# Patient Record
Sex: MALE | Race: WHITE | NOT HISPANIC OR LATINO | Employment: OTHER | URBAN - METROPOLITAN AREA
[De-identification: names, ages, dates, MRNs, and addresses within clinical notes are randomized per-mention and may not be internally consistent; named-entity substitution may affect disease eponyms.]

---

## 2019-03-20 ENCOUNTER — HOSPITAL ENCOUNTER (INPATIENT)
Facility: HOSPITAL | Age: 79
LOS: 3 days | Discharge: HOME HEALTH | DRG: 312 | End: 2019-03-23
Attending: EMERGENCY MEDICINE | Admitting: INTERNAL MEDICINE
Payer: MEDICARE

## 2019-03-20 DIAGNOSIS — S00.81XA ABRASION OF FACE, INITIAL ENCOUNTER: ICD-10-CM
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
## 2019-03-20 DIAGNOSIS — S01.81XA LACERATION OF FOREHEAD, INITIAL ENCOUNTER: ICD-10-CM

## 2019-03-20 DIAGNOSIS — S00.83XA TRAUMATIC HEMATOMA OF FOREHEAD, INITIAL ENCOUNTER: ICD-10-CM

## 2019-03-20 DIAGNOSIS — R42 DIZZINESS: Primary | ICD-10-CM

## 2019-03-20 DIAGNOSIS — I10 BENIGN ESSENTIAL HTN: ICD-10-CM

## 2019-03-20 DIAGNOSIS — R79.89 ELEVATED TROPONIN: ICD-10-CM

## 2019-03-20 DIAGNOSIS — R55 NEAR SYNCOPE: ICD-10-CM

## 2019-03-20 PROBLEM — E87.6 HYPOKALEMIA: Status: ACTIVE | Noted: 2019-03-20

## 2019-03-20 PROBLEM — M06.9 RHEUMATOID ARTHRITIS: Status: ACTIVE | Noted: 2019-03-20

## 2019-03-20 PROBLEM — T14.8XXA HEMATOMA: Status: ACTIVE | Noted: 2019-03-20

## 2019-03-20 PROBLEM — I95.1 ORTHOSTATIC HYPOTENSION: Status: ACTIVE | Noted: 2019-03-20

## 2019-03-20 PROBLEM — E87.1 HYPONATREMIA: Status: ACTIVE | Noted: 2019-03-20

## 2019-03-20 LAB
ALBUMIN SERPL BCP-MCNC: 3.7 G/DL
ALP SERPL-CCNC: 45 U/L
ALT SERPL W/O P-5'-P-CCNC: 16 U/L
ANION GAP SERPL CALC-SCNC: 12 MMOL/L
AST SERPL-CCNC: 22 U/L
BASOPHILS # BLD AUTO: 0.01 K/UL
BASOPHILS NFR BLD: 0.1 %
BILIRUB SERPL-MCNC: 1.2 MG/DL
BUN SERPL-MCNC: 32 MG/DL
CALCIUM SERPL-MCNC: 9 MG/DL
CHLORIDE SERPL-SCNC: 90 MMOL/L
CO2 SERPL-SCNC: 26 MMOL/L
CREAT SERPL-MCNC: 1.2 MG/DL
D DIMER PPP IA.FEU-MCNC: 3.91 MG/L FEU
DIFFERENTIAL METHOD: ABNORMAL
EOSINOPHIL # BLD AUTO: 0 K/UL
EOSINOPHIL NFR BLD: 0.5 %
ERYTHROCYTE [DISTWIDTH] IN BLOOD BY AUTOMATED COUNT: 12.8 %
EST. GFR  (AFRICAN AMERICAN): >60 ML/MIN/1.73 M^2
EST. GFR  (NON AFRICAN AMERICAN): 57 ML/MIN/1.73 M^2
GLUCOSE SERPL-MCNC: 115 MG/DL
HCT VFR BLD AUTO: 38.2 %
HGB BLD-MCNC: 13.8 G/DL
LYMPHOCYTES # BLD AUTO: 0.5 K/UL
LYMPHOCYTES NFR BLD: 5.5 %
MAGNESIUM SERPL-MCNC: 2.2 MG/DL
MCH RBC QN AUTO: 32.5 PG
MCHC RBC AUTO-ENTMCNC: 36.1 G/DL
MCV RBC AUTO: 90 FL
MONOCYTES # BLD AUTO: 0.8 K/UL
MONOCYTES NFR BLD: 10.1 %
NEUTROPHILS # BLD AUTO: 6.8 K/UL
NEUTROPHILS NFR BLD: 83.8 %
PLATELET # BLD AUTO: 210 K/UL
PMV BLD AUTO: 9.9 FL
POTASSIUM SERPL-SCNC: 2.9 MMOL/L
PROT SERPL-MCNC: 7 G/DL
RBC # BLD AUTO: 4.24 M/UL
SODIUM SERPL-SCNC: 128 MMOL/L
TROPONIN I SERPL DL<=0.01 NG/ML-MCNC: 0.02 NG/ML
WBC # BLD AUTO: 8.14 K/UL

## 2019-03-20 PROCEDURE — 84484 ASSAY OF TROPONIN QUANT: CPT

## 2019-03-20 PROCEDURE — 93010 EKG 12-LEAD: ICD-10-PCS | Mod: ,,, | Performed by: INTERNAL MEDICINE

## 2019-03-20 PROCEDURE — 36415 COLL VENOUS BLD VENIPUNCTURE: CPT

## 2019-03-20 PROCEDURE — 93005 ELECTROCARDIOGRAM TRACING: CPT

## 2019-03-20 PROCEDURE — 25000003 PHARM REV CODE 250: Performed by: REGISTERED NURSE

## 2019-03-20 PROCEDURE — 93010 ELECTROCARDIOGRAM REPORT: CPT | Mod: ,,, | Performed by: INTERNAL MEDICINE

## 2019-03-20 PROCEDURE — G0378 HOSPITAL OBSERVATION PER HR: HCPCS

## 2019-03-20 PROCEDURE — 11000001 HC ACUTE MED/SURG PRIVATE ROOM

## 2019-03-20 PROCEDURE — 83735 ASSAY OF MAGNESIUM: CPT

## 2019-03-20 PROCEDURE — 25000003 PHARM REV CODE 250: Performed by: EMERGENCY MEDICINE

## 2019-03-20 PROCEDURE — 90471 IMMUNIZATION ADMIN: CPT | Performed by: REGISTERED NURSE

## 2019-03-20 PROCEDURE — 99285 EMERGENCY DEPT VISIT HI MDM: CPT | Mod: 25

## 2019-03-20 PROCEDURE — 80053 COMPREHEN METABOLIC PANEL: CPT

## 2019-03-20 PROCEDURE — 90715 TDAP VACCINE 7 YRS/> IM: CPT | Performed by: REGISTERED NURSE

## 2019-03-20 PROCEDURE — 96360 HYDRATION IV INFUSION INIT: CPT

## 2019-03-20 PROCEDURE — 85379 FIBRIN DEGRADATION QUANT: CPT

## 2019-03-20 PROCEDURE — 25000003 PHARM REV CODE 250: Performed by: NURSE PRACTITIONER

## 2019-03-20 PROCEDURE — 63600175 PHARM REV CODE 636 W HCPCS: Performed by: REGISTERED NURSE

## 2019-03-20 PROCEDURE — 25500020 PHARM REV CODE 255: Performed by: EMERGENCY MEDICINE

## 2019-03-20 PROCEDURE — 85025 COMPLETE CBC W/AUTO DIFF WBC: CPT

## 2019-03-20 PROCEDURE — 12011 RPR F/E/E/N/L/M 2.5 CM/<: CPT

## 2019-03-20 RX ORDER — ONDANSETRON 4 MG/1
4 TABLET, ORALLY DISINTEGRATING ORAL
Status: COMPLETED | OUTPATIENT
Start: 2019-03-20 | End: 2019-03-20

## 2019-03-20 RX ORDER — FLUTICASONE PROPIONATE 50 MCG
1 SPRAY, SUSPENSION (ML) NASAL
COMMUNITY
Start: 2014-06-02

## 2019-03-20 RX ORDER — HYDRALAZINE HYDROCHLORIDE 20 MG/ML
10 INJECTION INTRAMUSCULAR; INTRAVENOUS EVERY 8 HOURS PRN
Status: DISCONTINUED | OUTPATIENT
Start: 2019-03-20 | End: 2019-03-23 | Stop reason: HOSPADM

## 2019-03-20 RX ORDER — BUSPIRONE HYDROCHLORIDE 7.5 MG/1
7.5 TABLET ORAL 2 TIMES DAILY
COMMUNITY

## 2019-03-20 RX ORDER — IBUPROFEN 200 MG
16 TABLET ORAL
Status: DISCONTINUED | OUTPATIENT
Start: 2019-03-20 | End: 2019-03-23 | Stop reason: HOSPADM

## 2019-03-20 RX ORDER — SODIUM CHLORIDE 0.9 % (FLUSH) 0.9 %
5 SYRINGE (ML) INJECTION
Status: DISCONTINUED | OUTPATIENT
Start: 2019-03-20 | End: 2019-03-23 | Stop reason: HOSPADM

## 2019-03-20 RX ORDER — HYDROCHLOROTHIAZIDE 25 MG/1
25 TABLET ORAL DAILY
Status: ON HOLD | COMMUNITY
End: 2019-03-23 | Stop reason: HOSPADM

## 2019-03-20 RX ORDER — POTASSIUM CHLORIDE 20 MEQ/1
40 TABLET, EXTENDED RELEASE ORAL
Status: COMPLETED | OUTPATIENT
Start: 2019-03-20 | End: 2019-03-20

## 2019-03-20 RX ORDER — VERAPAMIL HYDROCHLORIDE 240 MG/1
240 TABLET, FILM COATED, EXTENDED RELEASE ORAL 2 TIMES DAILY
Status: ON HOLD | COMMUNITY
End: 2019-03-23 | Stop reason: HOSPADM

## 2019-03-20 RX ORDER — GLUCAGON 1 MG
1 KIT INJECTION
Status: DISCONTINUED | OUTPATIENT
Start: 2019-03-20 | End: 2019-03-23 | Stop reason: HOSPADM

## 2019-03-20 RX ORDER — METHOTREXATE 2.5 MG/1
2.5 TABLET ORAL
COMMUNITY

## 2019-03-20 RX ORDER — HYDROXYCHLOROQUINE SULFATE 200 MG/1
TABLET, FILM COATED ORAL DAILY
COMMUNITY

## 2019-03-20 RX ORDER — IPRATROPIUM BROMIDE 42 UG/1
2 SPRAY, METERED NASAL
COMMUNITY
Start: 2019-02-26

## 2019-03-20 RX ORDER — ASPIRIN 81 MG/1
81 TABLET ORAL DAILY
COMMUNITY

## 2019-03-20 RX ORDER — IBUPROFEN 200 MG
24 TABLET ORAL
Status: DISCONTINUED | OUTPATIENT
Start: 2019-03-20 | End: 2019-03-23 | Stop reason: HOSPADM

## 2019-03-20 RX ORDER — POTASSIUM CHLORIDE 20 MEQ/1
40 TABLET, EXTENDED RELEASE ORAL ONCE
Status: COMPLETED | OUTPATIENT
Start: 2019-03-20 | End: 2019-03-20

## 2019-03-20 RX ORDER — SODIUM CHLORIDE 9 MG/ML
INJECTION, SOLUTION INTRAVENOUS CONTINUOUS
Status: DISCONTINUED | OUTPATIENT
Start: 2019-03-20 | End: 2019-03-23 | Stop reason: HOSPADM

## 2019-03-20 RX ORDER — ATENOLOL 50 MG/1
50 TABLET ORAL DAILY
Status: ON HOLD | COMMUNITY
End: 2019-03-23 | Stop reason: HOSPADM

## 2019-03-20 RX ORDER — ONDANSETRON 8 MG/1
8 TABLET, ORALLY DISINTEGRATING ORAL EVERY 8 HOURS PRN
Status: DISCONTINUED | OUTPATIENT
Start: 2019-03-20 | End: 2019-03-23 | Stop reason: HOSPADM

## 2019-03-20 RX ORDER — ONDANSETRON 2 MG/ML
4 INJECTION INTRAMUSCULAR; INTRAVENOUS EVERY 8 HOURS PRN
Status: DISCONTINUED | OUTPATIENT
Start: 2019-03-20 | End: 2019-03-23 | Stop reason: HOSPADM

## 2019-03-20 RX ADMIN — BACITRACIN, NEOMYCIN, POLYMYXIN B 1 EACH: 400; 3.5; 5 OINTMENT TOPICAL at 05:03

## 2019-03-20 RX ADMIN — SODIUM CHLORIDE 1000 ML: 0.9 INJECTION, SOLUTION INTRAVENOUS at 02:03

## 2019-03-20 RX ADMIN — POTASSIUM CHLORIDE 40 MEQ: 1500 TABLET, EXTENDED RELEASE ORAL at 04:03

## 2019-03-20 RX ADMIN — IOHEXOL 100 ML: 350 INJECTION, SOLUTION INTRAVENOUS at 04:03

## 2019-03-20 RX ADMIN — Medication 1 ML: at 02:03

## 2019-03-20 RX ADMIN — SODIUM CHLORIDE: 0.9 INJECTION, SOLUTION INTRAVENOUS at 07:03

## 2019-03-20 RX ADMIN — ONDANSETRON 4 MG: 4 TABLET, ORALLY DISINTEGRATING ORAL at 04:03

## 2019-03-20 RX ADMIN — CLOSTRIDIUM TETANI TOXOID ANTIGEN (FORMALDEHYDE INACTIVATED), CORYNEBACTERIUM DIPHTHERIAE TOXOID ANTIGEN (FORMALDEHYDE INACTIVATED), BORDETELLA PERTUSSIS TOXOID ANTIGEN (GLUTARALDEHYDE INACTIVATED), BORDETELLA PERTUSSIS FILAMENTOUS HEMAGGLUTININ ANTIGEN (FORMALDEHYDE INACTIVATED), BORDETELLA PERTUSSIS PERTACTIN ANTIGEN, AND BORDETELLA PERTUSSIS FIMBRIAE 2/3 ANTIGEN 0.5 ML: 5; 2; 2.5; 5; 3; 5 INJECTION, SUSPENSION INTRAMUSCULAR at 04:03

## 2019-03-20 RX ADMIN — POTASSIUM CHLORIDE 40 MEQ: 1500 TABLET, EXTENDED RELEASE ORAL at 07:03

## 2019-03-20 NOTE — ED PROVIDER NOTES
SCRIBE #1 NOTE: I, Nafisa Cunningham, am scribing for, and in the presence of,  REBECCA Ferrara. I have scribed the following portions of the note - Other sections scribed: ED Discussion and Disposition.         HISTORY     Chief Complaint   Patient presents with    Fall     ground level fall in parking lot. abrasion and lac to right side of face. denies LOC     Review of patient's allergies indicates:  No Known Allergies     HPI   The history is provided by the patient.   Fall   The accident occurred just prior to arrival. The fall occurred while walking. Distance fallen: Ground level. He landed on concrete. The volume of blood lost was minimal. The point of impact was the face. The pain is present in the face. The pain is at a severity of 0/10. He was ambulatory at the scene. There was no entrapment after the fall. There was no drug use involved in the accident. There was no alcohol use involved in the accident. Pertinent negatives include no neck pain, no back pain, no visual change, no fever, no abdominal pain, no nausea, no vomiting, no headaches and no loss of consciousness.   [facility]. He states that he has problems with his glucose dropping, however pt has not taken his glucose to know if glucose is actually dropping. He reports intermittent dizzy spells and nausea over the last 2 months. Pt denies any NVD, CP, SOB, headache or any other symptoms at this time.      PCP: Leonides Muñoz MD     Past Medical History:  Past Medical History:   Diagnosis Date    Hypertension     Rheumatoid arthritis         Past Surgical History:  History reviewed. No pertinent history.      Family History:  History reviewed. No pertinent family history.     Social History:  Social History     Tobacco Use    Smoking status: Unknown   Substance and Sexual Activity    Alcohol use: Unknown    Drug use: Unknown    Sexual activity: Unknown         ROS   Review of  Systems   Constitutional: Negative for fever.   HENT: Negative for sore throat.    Respiratory: Negative for shortness of breath.    Cardiovascular: Negative for chest pain.   Gastrointestinal: Negative for abdominal pain, nausea and vomiting.   Genitourinary: Negative for dysuria.   Musculoskeletal: Negative for back pain and neck pain.   Skin: Negative for rash.        + Abrasions R face  + Laceration to forehead and nose    Neurological: Positive for dizziness. Negative for loss of consciousness, weakness and headaches.   Hematological: Does not bruise/bleed easily.   All other systems reviewed and are negative.      PHYSICAL EXAM     Initial Vitals [03/20/19 1302]   BP Pulse Resp Temp SpO2   (!) 142/78 68 18 98.8 °F (37.1 °C) (!) 93 %      MAP       --           Physical Exam    Constitutional: He appears well-developed and well-nourished. No distress.   HENT:   Head: Normocephalic. Head is with abrasion, with contusion and with laceration. Head is without raccoon's eyes, without Hargrove's sign and without right periorbital erythema.       Right Ear: No hemotympanum.   Left Ear: No hemotympanum.   Nose: Nose lacerations present.       Superficial laceration to bridge of nose, 1 cm partial thickness laceration with surrounding abrasions to forehead   Eyes: Conjunctivae and EOM are normal. Pupils are equal, round, and reactive to light.   Neck: Normal range of motion. Neck supple.   Cardiovascular: Normal rate and regular rhythm.   Pulmonary/Chest: Breath sounds normal. No respiratory distress. He has no wheezes. He has no rales.   Abdominal: Soft. Bowel sounds are normal.   Musculoskeletal: Normal range of motion.        Right lower leg: He exhibits no swelling and no edema.        Left lower leg: He exhibits no swelling and no edema.   Neurological: He is alert and oriented to person, place, and time. He has normal strength. No cranial nerve deficit or sensory deficit. GCS eye subscore is 4. GCS verbal subscore  "is 5. GCS motor subscore is 6.   Skin: Skin is warm and dry. Capillary refill takes less than 2 seconds. No rash noted.   Psychiatric: He has a normal mood and affect.          ED COURSE   Lac Repair  Date/Time: 3/20/2019 3:27 PM  Performed by: Arcenio Salomon Jr., FNP  Authorized by: Abby Dunaway MD   Consent Done: Yes  Consent: Verbal consent obtained.  Consent given by: patient  Body area: head/neck  Location details: forehead  Laceration length: 1 cm  Foreign bodies: no foreign bodies    Anesthesia:  Local Anesthetic: LET (lido,epi,tetracaine)  Patient sedated: no  Preparation: Patient was prepped and draped in the usual sterile fashion.  Irrigation solution: saline  Irrigation method: syringe  Amount of cleaning: standard  Skin closure: 5-0 Prolene  Number of sutures: 2  Technique: simple  Approximation: close  Approximation difficulty: simple  Dressing: 4x4 sterile gauze  Patient tolerance: Patient tolerated the procedure well with no immediate complications        ED ONGOING VITALS:  Vitals:    03/20/19 1302 03/20/19 1405 03/20/19 1407 03/20/19 1409   BP: (!) 142/78 (!) 173/75 119/78 (!) 91/48   Pulse: 68 (!) 59 67 68   Resp: 18      Temp: 98.8 °F (37.1 °C)      TempSrc: Oral      SpO2: (!) 93%      Height: 5' 9" (1.753 m)       03/20/19 1510 03/20/19 1720 03/20/19 1721 03/20/19 1736   BP: (!) 151/76 (!) 174/87 (!) 174/83 122/65   Pulse: 68 73 77 83   Resp: 18      Temp: 98.6 °F (37 °C)      TempSrc: Oral      SpO2: 97%      Height:             ABNORMAL LAB VALUES:  Labs Reviewed   CBC W/ AUTO DIFFERENTIAL - Abnormal; Notable for the following components:       Result Value    RBC 4.24 (*)     Hemoglobin 13.8 (*)     Hematocrit 38.2 (*)     MCH 32.5 (*)     MCHC 36.1 (*)     Lymph # 0.5 (*)     Gran% 83.8 (*)     Lymph% 5.5 (*)     All other components within normal limits   COMPREHENSIVE METABOLIC PANEL - Abnormal; Notable for the following components:    Sodium 128 (*)     Potassium 2.9 (*)     " Chloride 90 (*)     Glucose 115 (*)     BUN, Bld 32 (*)     Total Bilirubin 1.2 (*)     Alkaline Phosphatase 45 (*)     eGFR if non  57 (*)     All other components within normal limits   D DIMER, QUANTITATIVE - Abnormal; Notable for the following components:    D-Dimer 3.91 (*)     All other components within normal limits   TROPONIN I   URINALYSIS, REFLEX TO URINE CULTURE         ALL LAB VALUES:  Results for orders placed or performed during the hospital encounter of 03/20/19   CBC auto differential   Result Value Ref Range    WBC 8.14 3.90 - 12.70 K/uL    RBC 4.24 (L) 4.60 - 6.20 M/uL    Hemoglobin 13.8 (L) 14.0 - 18.0 g/dL    Hematocrit 38.2 (L) 40.0 - 54.0 %    MCV 90 82 - 98 fL    MCH 32.5 (H) 27.0 - 31.0 pg    MCHC 36.1 (H) 32.0 - 36.0 g/dL    RDW 12.8 11.5 - 14.5 %    Platelets 210 150 - 350 K/uL    MPV 9.9 9.2 - 12.9 fL    Gran # (ANC) 6.8 1.8 - 7.7 K/uL    Lymph # 0.5 (L) 1.0 - 4.8 K/uL    Mono # 0.8 0.3 - 1.0 K/uL    Eos # 0.0 0.0 - 0.5 K/uL    Baso # 0.01 0.00 - 0.20 K/uL    Gran% 83.8 (H) 38.0 - 73.0 %    Lymph% 5.5 (L) 18.0 - 48.0 %    Mono% 10.1 4.0 - 15.0 %    Eosinophil% 0.5 0.0 - 8.0 %    Basophil% 0.1 0.0 - 1.9 %    Differential Method Automated    Comprehensive metabolic panel   Result Value Ref Range    Sodium 128 (L) 136 - 145 mmol/L    Potassium 2.9 (L) 3.5 - 5.1 mmol/L    Chloride 90 (L) 95 - 110 mmol/L    CO2 26 23 - 29 mmol/L    Glucose 115 (H) 70 - 110 mg/dL    BUN, Bld 32 (H) 8 - 23 mg/dL    Creatinine 1.2 0.5 - 1.4 mg/dL    Calcium 9.0 8.7 - 10.5 mg/dL    Total Protein 7.0 6.0 - 8.4 g/dL    Albumin 3.7 3.5 - 5.2 g/dL    Total Bilirubin 1.2 (H) 0.1 - 1.0 mg/dL    Alkaline Phosphatase 45 (L) 55 - 135 U/L    AST 22 10 - 40 U/L    ALT 16 10 - 44 U/L    Anion Gap 12 8 - 16 mmol/L    eGFR if African American >60 >60 mL/min/1.73 m^2    eGFR if non African American 57 (A) >60 mL/min/1.73 m^2   Troponin I   Result Value Ref Range    Troponin I 0.021 0.000 - 0.026 ng/mL   D dimer,  quantitative   Result Value Ref Range    D-Dimer 3.91 (H) <0.50 mg/L FEU           RADIOLOGY STUDIES:  Imaging Results          CTA Chest Non-Coronary (PE Study) (Final result)  Result time 03/20/19 16:59:54    Final result by Jaspreet Bernard MD (03/20/19 16:59:54)                 Impression:      No acute abnormality identified.  No evidence of pulmonary embolism.      Electronically signed by: Jaspreet Bernard  Date:    03/20/2019  Time:    16:59             Narrative:    EXAMINATION:  CTA CHEST NON CORONARY    CLINICAL HISTORY:  dizziness with elevated d-dimer;    TECHNIQUE:  Low dose axial images, sagittal and coronal reformations were obtained from the thoracic inlet to the lung bases following the IV administration of 100 mL of Omnipaque 350.  All CT scans at this location are performed using dose modulation techniques as appropriate to a performed exam including the following: Automated exposure control; adjustment of the mA and/or kV  according to patient size.    COMPARISON:  Chest radiograph 03/20/2019    FINDINGS:  Base of Neck: No significant abnormality.    Thoracic soft tissues: Unremarkable.    Aorta: Left-sided aortic arch.  No aneurysm.  Advanced atherosclerotic calcification aorta and coronary arteries.    Heart: Moderate cardiomegaly.  No pericardial effusion.    Pulmonary vasculature: No evidence of pulmonary embolism.    Nidhi/Mediastinum: No pathologic jonathan enlargement.  Calcified mediastinal and hilar lymph nodes.    Airways: Mild bronchial wall thickening.    Lungs/Pleura: Subsegmental atelectasis posterior lung bases.  No pleural effusion or thickening.    Esophagus: Unremarkable.    Upper Abdomen: Moderate hiatal hernia.  Large bilateral renal cysts, 7 cm on the right, partially visualized 4.1 cm on the left.    Bones: No acute fracture. No suspicious lytic or sclerotic lesions.                               CT Head Without Contrast (Final result)  Result time 03/20/19 14:00:06    Final result  by ABDULLAHI Trejo Sr., MD (03/20/19 14:00:06)                 Impression:      1. There is a small hematoma overlying the right side of the frontal bone.  2. There is no calvarial fracture or intracranial hemorrhage.  3. There is mild generalized cerebral and cerebellar atrophy. There are chronic appearing ischemic changes in the deep white matter of both cerebral hemispheres. There is no evidence of an acute ischemic event.  All CT scans at this facility use dose modulation, iterative reconstruction, and/or weight base dosing when appropriate to reduce radiation dose when appropriate to reduce radiation dose to as low as reasonably achievable.      Electronically signed by: Omar Trejo MD  Date:    03/20/2019  Time:    14:00             Narrative:    EXAMINATION:  CT HEAD WITHOUT CONTRAST    CLINICAL HISTORY:  Dizziness;    TECHNIQUE:  Standard brain CT protocol without IV contrast was performed.    COMPARISON:  None    FINDINGS:  There is a small hematoma overlying the right side of the frontal bone.  There is no calvarial fracture or intracranial hemorrhage.  There is mild generalized cerebral and cerebellar atrophy.  There are chronic appearing ischemic changes in the deep white matter of both cerebral hemispheres.  There is no evidence of an acute ischemic event.  The paranasal sinuses are normal in appearance.                               X-Ray Chest PA And Lateral (Final result)  Result time 03/20/19 13:40:37    Final result by ABDULLAHI Trejo Sr., MD (03/20/19 13:40:37)                 Impression:      1. There is no evidence of an acute pulmonary process.  2. There is a 13 mm calcific density projected over the anterolateral aspect of the left 5th rib.  3. There is a mild amount of dextroconvex curvature of the thoracic spine. There are mild degenerative changes in the thoracic spine.  .      Electronically signed by: Omar Trejo MD  Date:    03/20/2019  Time:    13:40             Narrative:     EXAMINATION:  XR CHEST PA AND LATERAL    CLINICAL HISTORY:  Dizziness and giddiness    COMPARISON:  None    FINDINGS:  The size and contour of the heart are normal.  There is no evidence of an acute pulmonary process.  There is a 13 mm calcific density projected over the anterolateral aspect of the left 5th rib.  There is no pneumothorax or pleural effusion.  There are mild degenerative changes in the thoracic spine.  There is a mild amount of dextroconvex curvature of the thoracic spine.                                  EKG Readings: (Independently Interpreted)   Initial Reading: No STEMI. Heart Rate: 60. Clinical Impression: AV Block - 1st Degree   Sinus rhythm with sinus arrhythmia with 1st degree A-V block  Left anterior fascicular block  ST and T wave abnormality, consider anterior ischemia      Patients medications:   Outpatient Medications Marked as Taking for the 12/19/18 encounter (Office Visit) with Leonides Muñoz MD   Medication Sig Dispense Refill    atenolol (TENORMIN) 50 mg tablet Take 1 tablet by mouth once daily. 30 tablet 1    fluticasone (FLONASE) 50 mcg/actuation nasal spray 1 spray by Nasal route daily. (Patient taking differently: 1 spray by Nasal route as needed.) 16 g 5    hydroCHLOROthiazide (HYDRODIURIL) 25 mg tablet Take 1 tablet by mouth daily. 30 tablet 5    hydroxychloroquine (PLAQUENIL) 200 mg tablet TAKE TWO TABLETS BY MOUTH DAILY (Patient taking differently: one tablet daily) 60 tablet 11    ipratropium (ATROVENT) 42 mcg (0.06 %) nasal spray use 2 sprays in each nostril three times a day 15 mL 0    irbesartan (AVAPRO) 300 MG tablet Take 1 tablet by mouth daily. 30 tablet 5    methotrexate 2.5 mg tablet take 4 tablets by mouth every wednesday 16 tablet 3    MULTIVITAMIN (MULTIPLE VITAMINS ORAL) Take by mouth.    verapamil ER (CALAN-SR) 240 mg CR tablet TAKE ONE TABLET BY MOUTH TWICE DAILY 60 tablet 0         ED Medications:  There are no discharge medications for this  patient.    Discharge Medications:  New Prescriptions    No medications on file         ED Discussion  6:03 PM: Discussed case with ASHLEY Carl ([facility]). Dr. Montaño agrees with current care and management of pt and accepts admission.   Admitting Service: [facility]  Admitting Physician: Dr. Montaño  Admit to: Obs/Tele    6:15 PM: Re-evaluated pt. I have discussed test results, shared treatment plan, and the need for admission with patient and family at bedside. Pt and family express understanding at this time and agree with all information. All questions answered. Pt and family have no further questions or concerns at this time. Pt is ready for admit.      MEDICAL DECISION MAKING   Medical Decision Making:   Clinical Tests:   Lab Tests: Reviewed and Ordered  Radiological Study: Ordered and Reviewed  Medical Tests: Reviewed and Ordered            Scribe Attestation:   Scribe #1: I performed the above scribed service and the documentation accurately describes the services I performed. I attest to the accuracy of the note.    Attending Attestation:           Physician Attestation for Scribe:  Physician Attestation Statement for Scribe #1: I, REBECCA Ferrara, reviewed documentation, as scribed by Nafisa Cunningahm in my presence, and it is both accurate and complete.             CLINICAL IMPRESSION       ICD-10-CM ICD-9-CM   1. Dizziness R42 780.4   2. Fall, initial encounter W19.XXXA E888.9   3. Abrasion of face, initial encounter S00.81XA 910.0   4. Laceration of forehead, initial encounter S01.81XA 873.42   5. Traumatic hematoma of forehead, initial encounter S00.83XA 920       Disposition:   Disposition: Placed in Observation  Condition: Stable         Arcenio Salomon Jr., REBECCA  03/20/19 3431

## 2019-03-20 NOTE — ASSESSMENT & PLAN NOTE
- Secondary to fall PTA  - CT head showed a small hematoma overlying the right side of the frontal bone.  No calvarial fracture or intracranial hemorrhage.    - Continue local wound care  - No anticoagulation  - Monitor

## 2019-03-20 NOTE — SUBJECTIVE & OBJECTIVE
Past Medical History:   Diagnosis Date    Hypertension     Rheumatoid arthritis        No past surgical history on file.    Review of patient's allergies indicates:  No Known Allergies    No current facility-administered medications on file prior to encounter.      Current Outpatient Medications on File Prior to Encounter   Medication Sig    busPIRone (BUSPAR) 7.5 MG tablet Take 7.5 mg by mouth 2 (two) times daily.    methotrexate 2.5 MG Tab Take 2.5 mg by mouth every 7 days. Take 4 tablets every Wednesday    verapamil (CALAN-SR) 240 MG CR tablet Take 240 mg by mouth 2 (two) times daily.     Family History     None        Tobacco Use    Smoking status: Not on file   Substance and Sexual Activity    Alcohol use: Not on file    Drug use: Not on file    Sexual activity: Not on file     Review of Systems   Constitutional: Negative for chills, diaphoresis, fatigue and fever.   HENT: Negative for hearing loss, mouth sores, sore throat, tinnitus and trouble swallowing.    Eyes: Negative for pain, discharge and redness.   Respiratory: Negative for apnea, cough, choking, chest tightness, shortness of breath, wheezing and stridor.    Cardiovascular: Negative for chest pain, palpitations and leg swelling.   Gastrointestinal: Negative for abdominal distention, abdominal pain, blood in stool, constipation, diarrhea, nausea, rectal pain and vomiting.   Endocrine: Negative for cold intolerance, heat intolerance, polydipsia, polyphagia and polyuria.   Genitourinary: Negative for difficulty urinating, dysuria, flank pain, frequency, hematuria and urgency.   Musculoskeletal: Negative for arthralgias, back pain, gait problem, joint swelling, neck pain and neck stiffness.   Skin: Negative for color change, rash and wound.   Allergic/Immunologic: Negative for food allergies.   Neurological: Positive for dizziness and light-headedness. Negative for tremors, seizures, syncope, speech difficulty and headaches.   Hematological:  Negative for adenopathy. Does not bruise/bleed easily.   Psychiatric/Behavioral: Negative for agitation and confusion. The patient is not nervous/anxious.    All other systems reviewed and are negative.    Objective:     Vital Signs (Most Recent):  Temp: 98.6 °F (37 °C) (03/20/19 1510)  Pulse: 83 (03/20/19 1736)  Resp: 18 (03/20/19 1510)  BP: 122/65 (03/20/19 1736)  SpO2: 97 % (03/20/19 1510) Vital Signs (24h Range):  Temp:  [98.6 °F (37 °C)-98.8 °F (37.1 °C)] 98.6 °F (37 °C)  Pulse:  [59-83] 83  Resp:  [18] 18  SpO2:  [93 %-97 %] 97 %  BP: ()/(48-87) 122/65     Weight: (primary to weigh)  There is no height or weight on file to calculate BMI.    Physical Exam   Constitutional: He is oriented to person, place, and time. He appears well-developed and well-nourished. No distress.   HENT:   Head: Normocephalic.       Mouth/Throat: Oropharynx is clear and moist.   Eyes: Conjunctivae and EOM are normal. Pupils are equal, round, and reactive to light.       PERRLA, size 3 bilaterally with brisk response   Neck: Normal range of motion. Neck supple. No JVD present. Carotid bruit is not present.   Cardiovascular: Normal rate, regular rhythm, normal heart sounds and intact distal pulses. Exam reveals no gallop and no friction rub.   No murmur heard.  Pulmonary/Chest: Effort normal and breath sounds normal. No stridor. No respiratory distress. He has no wheezes. He has no rales. He exhibits no tenderness.   Abdominal: Soft. Bowel sounds are normal. He exhibits no distension and no mass. There is no tenderness. There is no rebound and no guarding.   Musculoskeletal: Normal range of motion. He exhibits no edema or tenderness.   Neurological: He is alert and oriented to person, place, and time. He has normal strength. No cranial nerve deficit or sensory deficit. GCS eye subscore is 4. GCS verbal subscore is 5. GCS motor subscore is 6.   Skin: Skin is warm and dry. No rash noted. He is not diaphoretic. No erythema.    Multiple abrasions/lacerations to right side of face, forehead, nose, and right knee.   Psychiatric: He has a normal mood and affect. His behavior is normal. Judgment and thought content normal.   Nursing note and vitals reviewed.        CRANIAL NERVES     CN III, IV, VI   Pupils are equal, round, and reactive to light.  Extraocular motions are normal.        Significant Labs:   CBC:   Recent Labs   Lab 03/20/19  1407   WBC 8.14   HGB 13.8*   HCT 38.2*        CMP:   Recent Labs   Lab 03/20/19  1407   *   K 2.9*   CL 90*   CO2 26   *   BUN 32*   CREATININE 1.2   CALCIUM 9.0   PROT 7.0   ALBUMIN 3.7   BILITOT 1.2*   ALKPHOS 45*   AST 22   ALT 16   ANIONGAP 12   EGFRNONAA 57*     Cardiac Markers: No results for input(s): CKMB, MYOGLOBIN, BNP, TROPISTAT in the last 48 hours.  Troponin:   Recent Labs   Lab 03/20/19  1407   TROPONINI 0.021       Significant Imaging: I have reviewed all pertinent imaging results/findings within the past 24 hours.   Imaging Results          CTA Chest Non-Coronary (PE Study) (Final result)  Result time 03/20/19 16:59:54    Final result by Jaspreet Bernard MD (03/20/19 16:59:54)                 Impression:      No acute abnormality identified.  No evidence of pulmonary embolism.      Electronically signed by: Jaspreet Bernard  Date:    03/20/2019  Time:    16:59             Narrative:    EXAMINATION:  CTA CHEST NON CORONARY    CLINICAL HISTORY:  dizziness with elevated d-dimer;    TECHNIQUE:  Low dose axial images, sagittal and coronal reformations were obtained from the thoracic inlet to the lung bases following the IV administration of 100 mL of Omnipaque 350.  All CT scans at this location are performed using dose modulation techniques as appropriate to a performed exam including the following: Automated exposure control; adjustment of the mA and/or kV  according to patient size.    COMPARISON:  Chest radiograph 03/20/2019    FINDINGS:  Base of Neck: No significant  abnormality.    Thoracic soft tissues: Unremarkable.    Aorta: Left-sided aortic arch.  No aneurysm.  Advanced atherosclerotic calcification aorta and coronary arteries.    Heart: Moderate cardiomegaly.  No pericardial effusion.    Pulmonary vasculature: No evidence of pulmonary embolism.    Nidhi/Mediastinum: No pathologic jonathan enlargement.  Calcified mediastinal and hilar lymph nodes.    Airways: Mild bronchial wall thickening.    Lungs/Pleura: Subsegmental atelectasis posterior lung bases.  No pleural effusion or thickening.    Esophagus: Unremarkable.    Upper Abdomen: Moderate hiatal hernia.  Large bilateral renal cysts, 7 cm on the right, partially visualized 4.1 cm on the left.    Bones: No acute fracture. No suspicious lytic or sclerotic lesions.                               CT Head Without Contrast (Final result)  Result time 03/20/19 14:00:06    Final result by ABDULLAHI Trejo Sr., MD (03/20/19 14:00:06)                 Impression:      1. There is a small hematoma overlying the right side of the frontal bone.  2. There is no calvarial fracture or intracranial hemorrhage.  3. There is mild generalized cerebral and cerebellar atrophy. There are chronic appearing ischemic changes in the deep white matter of both cerebral hemispheres. There is no evidence of an acute ischemic event.  All CT scans at this facility use dose modulation, iterative reconstruction, and/or weight base dosing when appropriate to reduce radiation dose when appropriate to reduce radiation dose to as low as reasonably achievable.      Electronically signed by: Omar Trejo MD  Date:    03/20/2019  Time:    14:00             Narrative:    EXAMINATION:  CT HEAD WITHOUT CONTRAST    CLINICAL HISTORY:  Dizziness;    TECHNIQUE:  Standard brain CT protocol without IV contrast was performed.    COMPARISON:  None    FINDINGS:  There is a small hematoma overlying the right side of the frontal bone.  There is no calvarial fracture or  intracranial hemorrhage.  There is mild generalized cerebral and cerebellar atrophy.  There are chronic appearing ischemic changes in the deep white matter of both cerebral hemispheres.  There is no evidence of an acute ischemic event.  The paranasal sinuses are normal in appearance.                               X-Ray Chest PA And Lateral (Final result)  Result time 03/20/19 13:40:37    Final result by ABDULLAHI Trejo Sr., MD (03/20/19 13:40:37)                 Impression:      1. There is no evidence of an acute pulmonary process.  2. There is a 13 mm calcific density projected over the anterolateral aspect of the left 5th rib.  3. There is a mild amount of dextroconvex curvature of the thoracic spine. There are mild degenerative changes in the thoracic spine.  .      Electronically signed by: Omar Trejo MD  Date:    03/20/2019  Time:    13:40             Narrative:    EXAMINATION:  XR CHEST PA AND LATERAL    CLINICAL HISTORY:  Dizziness and giddiness    COMPARISON:  None    FINDINGS:  The size and contour of the heart are normal.  There is no evidence of an acute pulmonary process.  There is a 13 mm calcific density projected over the anterolateral aspect of the left 5th rib.  There is no pneumothorax or pleural effusion.  There are mild degenerative changes in the thoracic spine.  There is a mild amount of dextroconvex curvature of the thoracic spine.

## 2019-03-20 NOTE — ASSESSMENT & PLAN NOTE
- Placed in OBS  - CT head showed a small hematoma overlying the right side of the frontal bone.  No calvarial fracture or intracranial hemorrhage.  There is mild generalized cerebral and cerebellar atrophy.  There are chronic appearing ischemic changes in the deep white matter of both cerebral hemispheres.  There is no evidence of an acute ischemic event.   - Positive orthostatic BP in the ER  - Hold home Verapamil for now  - Continuous IVFs  - Place MAX hose  - Carotid US and ECHO pending  - Check orthostatics  - Neuro checks q 4 hours  - Tele monitoring

## 2019-03-20 NOTE — ED NOTES
Patient complains of falling after feeling dizzy. Associated symptoms include lac to face.     Level of Consciousness: The patient is awake, alert, and oriented with appropriate affect and speech; oriented to person, place and time.  Appearance: Sitting up in bed with no acute distress noted. Clothing and hygiene are clean and worn appropriately.  Skin: Skin is warm and dry with good skin turgor; intact; color consistent with ethnicity.  Mucous membranes are moist. Lac and abrasion to right side of forehead,  Musculoskeletal: Moves all extremities well in full range of motion. No obvious deformities or swelling noted.  Respiratory: Airway open and patent, respirations spontaneous, even and unlabored. No accessory muscles in use. Breath sounds clear.  Cardiac: Regular rate and rhythm, no peripheral edema noted, good pulses palpated peripherally, capillary refill < 3 seconds.  Abdomen: Soft, non-tender to palpation. No distention noted.  Neurologic: PERRLA, face exhibits symmetrical expression, hand grasps equal and even bilaterally, reports normal sensation to all extremities and face.  Psychosocial: calm and cooperative.     Patient verbalized understanding of status and plan of care. Side rails up x 2, call light in reach, bed low and locked.  Will continue to monitor.

## 2019-03-20 NOTE — HPI
"Khang Patterson is a 79 y/o male with a PMHx of HTN and RA, who presented to the ER today for evaluation secondary to Near Syncope and a Fall.  Patient was reportedly walking in the [employer] parking lot today when he became dizzy and fell, striking his head and right knee on the concrete.  Patient reports episodes of dizziness "on and off for about 3 weeks now where I have to hold onto things to keep from falling".  He is noted to have a lacerations to his nose and forehead and a hematoma above his right eye.  He denies CP, SOB, H/A, visual disturbances, N/V/D, abdominal pain, dysuria, and all other symptoms at this time.  Patient does report periods of hypoglycemia, but is uncertain if his blood glucose was low at the time of his fall.  No aggrevating or alleviating factors reported.  No prior treatment.  ER workup showed:  Stable VS, but positive orthostatics.  12 lead EKG showed Sinus rhythm with sinus arrhythmia with 1st degree A-V block.  CBC unremarkable.  D dimer elevated to 3.91.  CTA chest negative for PE.  BMP revealed Na of 128, KCL 2.9, Cl 90, .  Initial troponin negative.  CT head showed a small hematoma overlying the right side of the frontal bone.  No calvarial fracture or intracranial hemorrhage.  There is mild generalized cerebral and cerebellar atrophy.  There are chronic appearing ischemic changes in the deep white matter of both cerebral hemispheres.  There is no evidence of an acute ischemic event.  ECHO and Carotid US are pending.  Hospital Medicine called for admission.  He will be placed in OBS.  He is a Full Code.  His SDM is his son Adis who can be reached at [phone].  "

## 2019-03-21 PROBLEM — R94.31 ABNORMAL ECG: Status: ACTIVE | Noted: 2019-03-21

## 2019-03-21 PROBLEM — R42 DIZZINESS: Status: RESOLVED | Noted: 2019-03-21 | Resolved: 2019-03-21

## 2019-03-21 PROBLEM — E87.6 HYPOKALEMIA: Status: RESOLVED | Noted: 2019-03-20 | Resolved: 2019-03-21
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
## 2019-03-21 PROBLEM — R42 DIZZINESS: Status: ACTIVE | Noted: 2019-03-21

## 2019-03-21 LAB
ANION GAP SERPL CALC-SCNC: 6 MMOL/L
ANION GAP SERPL CALC-SCNC: 8 MMOL/L
AORTIC VALVE REGURGITATION: NORMAL
BASOPHILS # BLD AUTO: 0.01 K/UL
BASOPHILS NFR BLD: 0.1 %
BUN SERPL-MCNC: 16 MG/DL
BUN SERPL-MCNC: 19 MG/DL
CALCIUM SERPL-MCNC: 8.1 MG/DL
CALCIUM SERPL-MCNC: 8.2 MG/DL
CHLORIDE SERPL-SCNC: 97 MMOL/L
CHLORIDE SERPL-SCNC: 97 MMOL/L
CO2 SERPL-SCNC: 23 MMOL/L
CO2 SERPL-SCNC: 27 MMOL/L
CREAT SERPL-MCNC: 0.8 MG/DL
CREAT SERPL-MCNC: 0.9 MG/DL
DIFFERENTIAL METHOD: ABNORMAL
EOSINOPHIL # BLD AUTO: 0 K/UL
EOSINOPHIL NFR BLD: 0.4 %
ERYTHROCYTE [DISTWIDTH] IN BLOOD BY AUTOMATED COUNT: 12.8 %
EST. GFR  (AFRICAN AMERICAN): >60 ML/MIN/1.73 M^2
EST. GFR  (AFRICAN AMERICAN): >60 ML/MIN/1.73 M^2
EST. GFR  (NON AFRICAN AMERICAN): >60 ML/MIN/1.73 M^2
EST. GFR  (NON AFRICAN AMERICAN): >60 ML/MIN/1.73 M^2
ESTIMATED AVG GLUCOSE: 103 MG/DL
GLUCOSE SERPL-MCNC: 107 MG/DL
GLUCOSE SERPL-MCNC: 110 MG/DL
HBA1C MFR BLD HPLC: 5.2 %
HCT VFR BLD AUTO: 33.8 %
HGB BLD-MCNC: 12 G/DL
LYMPHOCYTES # BLD AUTO: 0.6 K/UL
LYMPHOCYTES NFR BLD: 6.2 %
MAGNESIUM SERPL-MCNC: 2 MG/DL
MCH RBC QN AUTO: 32.2 PG
MCHC RBC AUTO-ENTMCNC: 35.5 G/DL
MCV RBC AUTO: 91 FL
MITRAL VALVE REGURGITATION: NORMAL
MONOCYTES # BLD AUTO: 1.1 K/UL
MONOCYTES NFR BLD: 11.5 %
NEUTROPHILS # BLD AUTO: 7.6 K/UL
NEUTROPHILS NFR BLD: 81.8 %
PLATELET # BLD AUTO: 197 K/UL
PMV BLD AUTO: 10 FL
POTASSIUM SERPL-SCNC: 3.3 MMOL/L
POTASSIUM SERPL-SCNC: 3.8 MMOL/L
RBC # BLD AUTO: 3.73 M/UL
RETIRED EF AND QEF - SEE NOTES: 60 (ref 55–65)
SODIUM SERPL-SCNC: 128 MMOL/L
SODIUM SERPL-SCNC: 130 MMOL/L
TRICUSPID VALVE REGURGITATION: NORMAL
TROPONIN I SERPL DL<=0.01 NG/ML-MCNC: 0.39 NG/ML
WBC # BLD AUTO: 9.32 K/UL

## 2019-03-21 PROCEDURE — 93306 TTE W/DOPPLER COMPLETE: CPT

## 2019-03-21 PROCEDURE — 93306 2D ECHO WITH COLOR FLOW DOPPLER: ICD-10-PCS | Mod: 26,,, | Performed by: INTERNAL MEDICINE

## 2019-03-21 PROCEDURE — 80048 BASIC METABOLIC PNL TOTAL CA: CPT

## 2019-03-21 PROCEDURE — 97162 PT EVAL MOD COMPLEX 30 MIN: CPT | Performed by: PHYSICAL THERAPIST

## 2019-03-21 PROCEDURE — 85025 COMPLETE CBC W/AUTO DIFF WBC: CPT

## 2019-03-21 PROCEDURE — 99223 1ST HOSP IP/OBS HIGH 75: CPT | Mod: ,,, | Performed by: INTERNAL MEDICINE

## 2019-03-21 PROCEDURE — 84484 ASSAY OF TROPONIN QUANT: CPT

## 2019-03-21 PROCEDURE — 36415 COLL VENOUS BLD VENIPUNCTURE: CPT

## 2019-03-21 PROCEDURE — 99223 PR INITIAL HOSPITAL CARE,LEVL III: ICD-10-PCS | Mod: ,,, | Performed by: INTERNAL MEDICINE

## 2019-03-21 PROCEDURE — 83036 HEMOGLOBIN GLYCOSYLATED A1C: CPT

## 2019-03-21 PROCEDURE — 83735 ASSAY OF MAGNESIUM: CPT

## 2019-03-21 PROCEDURE — 25000003 PHARM REV CODE 250

## 2019-03-21 PROCEDURE — 21400001 HC TELEMETRY ROOM

## 2019-03-21 PROCEDURE — 93306 TTE W/DOPPLER COMPLETE: CPT | Mod: 26,,, | Performed by: INTERNAL MEDICINE

## 2019-03-21 PROCEDURE — 97530 THERAPEUTIC ACTIVITIES: CPT | Performed by: PHYSICAL THERAPIST

## 2019-03-21 RX ORDER — POTASSIUM CHLORIDE 20 MEQ/1
TABLET, EXTENDED RELEASE ORAL
Status: COMPLETED
Start: 2019-03-21 | End: 2019-03-21

## 2019-03-21 RX ORDER — POTASSIUM CHLORIDE 20 MEQ/1
40 TABLET, EXTENDED RELEASE ORAL ONCE
Status: COMPLETED | OUTPATIENT
Start: 2019-03-21 | End: 2019-03-21

## 2019-03-21 RX ADMIN — POTASSIUM CHLORIDE 40 MEQ: 20 TABLET, EXTENDED RELEASE ORAL at 08:03

## 2019-03-21 RX ADMIN — POTASSIUM CHLORIDE 40 MEQ: 1500 TABLET, EXTENDED RELEASE ORAL at 08:03

## 2019-03-21 NOTE — HPI
Pt had near syncope at [employer] yesterday.  He felt dizzy walking inside store, fell down.  He then got up and near his car, he again felt dizzy and fell down on face.  No clear syncope.   Denies chest pain sxs or unusual dyspnea.  Has had chronic intermittent dizziness, imbalance.  Denies h/o cad, chf, mi/cva.  ecg on admit NSR, LAFB 1 av block, anterior T wave inversions. No old ecg to compare.  Echo today shows normal LV function.  Troponin only checked once on admit and was negative.  His potassium and sodium were significantly low on admit and now improved with repletion, IV fluids.  His HTN meds have been held.    CTA chest for abnl d-dimer showed no PE.  Carotid u/s no significant disease.  He has been on high dose Verapamil 240 mg bid and Atenolol and HCTZ.

## 2019-03-21 NOTE — SUBJECTIVE & OBJECTIVE
Interval History:  No acute events overnight.  Resting comfortably in bed with wife at BS.  Carotid US negative.  ECHO pending.  Na improved to 130, KCL improved to 3.8.  Orthostatic hypotension persists, but dizziness is improving.  Continue IVFs.  Cardiology consult pending.  Will transition to inpatient for continued care.    Review of Systems   Constitutional: Negative for chills, diaphoresis, fatigue and fever.   HENT: Negative for hearing loss, mouth sores, sore throat, tinnitus and trouble swallowing.    Eyes: Negative for pain, discharge and redness.   Respiratory: Negative for apnea, cough, choking, chest tightness, shortness of breath, wheezing and stridor.    Cardiovascular: Negative for chest pain, palpitations and leg swelling.   Gastrointestinal: Negative for abdominal distention, abdominal pain, blood in stool, constipation, diarrhea, nausea, rectal pain and vomiting.   Endocrine: Negative for cold intolerance, heat intolerance, polydipsia, polyphagia and polyuria.   Genitourinary: Negative for difficulty urinating, dysuria, flank pain, frequency, hematuria and urgency.   Musculoskeletal: Negative for arthralgias, back pain, gait problem, joint swelling, neck pain and neck stiffness.   Skin: Negative for color change, rash and wound.   Allergic/Immunologic: Negative for food allergies.   Neurological: Positive for dizziness and light-headedness. Negative for tremors, seizures, syncope, speech difficulty and headaches.   Hematological: Negative for adenopathy. Does not bruise/bleed easily.   Psychiatric/Behavioral: Negative for agitation and confusion. The patient is not nervous/anxious.    All other systems reviewed and are negative.    Objective:     Vital Signs (Most Recent):  Temp: 97.7 °F (36.5 °C) (03/21/19 0922)  Pulse: 61 (03/21/19 1250)  Resp: 18 (03/21/19 0922)  BP: (!) 123/58 (03/21/19 1250)  SpO2: (!) 92 % (03/21/19 0922) Vital Signs (24h Range):  Temp:  [97.7 °F (36.5 °C)-98.6 °F (37 °C)]  97.7 °F (36.5 °C)  Pulse:  [54-83] 61  Resp:  [16-24] 18  SpO2:  [92 %-97 %] 92 %  BP: ()/(48-90) 123/58     Weight: 68.5 kg (151 lb 0.2 oz)  Body mass index is 22.3 kg/m².    Intake/Output Summary (Last 24 hours) at 3/21/2019 1309  Last data filed at 3/20/2019 1550  Gross per 24 hour   Intake 1000 ml   Output --   Net 1000 ml      Physical Exam   Constitutional: He is oriented to person, place, and time. He appears well-developed and well-nourished. No distress.   HENT:   Head: Normocephalic.       Mouth/Throat: Oropharynx is clear and moist.   Eyes: Conjunctivae and EOM are normal. Pupils are equal, round, and reactive to light.       PERRLA, size 3 bilaterally with brisk response   Neck: Normal range of motion. Neck supple. No JVD present. Carotid bruit is not present.   Cardiovascular: Normal rate, regular rhythm, normal heart sounds and intact distal pulses. Exam reveals no gallop and no friction rub.   No murmur heard.  Pulmonary/Chest: Effort normal and breath sounds normal. No stridor. No respiratory distress. He has no wheezes. He has no rales. He exhibits no tenderness.   Abdominal: Soft. Bowel sounds are normal. He exhibits no distension and no mass. There is no tenderness. There is no rebound and no guarding.   Musculoskeletal: Normal range of motion. He exhibits no edema or tenderness.   Neurological: He is alert and oriented to person, place, and time. He has normal strength. No cranial nerve deficit or sensory deficit. GCS eye subscore is 4. GCS verbal subscore is 5. GCS motor subscore is 6.   Skin: Skin is warm and dry. No rash noted. He is not diaphoretic. No erythema.   Multiple abrasions/lacerations to right side of face, forehead, nose, and right knee.   Psychiatric: He has a normal mood and affect. His behavior is normal. Judgment and thought content normal.   Nursing note and vitals reviewed.      Significant Labs:   A1C:   Recent Labs   Lab 03/21/19  0500   HGBA1C 5.2     BMP:   Recent  Labs   Lab 03/21/19  0500 03/21/19  1147    107   * 130*   K 3.3* 3.8   CL 97 97   CO2 23 27   BUN 19 16   CREATININE 0.8 0.9   CALCIUM 8.1* 8.2*   MG 2.0  --      CBC:   Recent Labs   Lab 03/20/19  1407 03/21/19  0500   WBC 8.14 9.32   HGB 13.8* 12.0*   HCT 38.2* 33.8*    197     Magnesium:   Recent Labs   Lab 03/20/19  1407 03/21/19  0500   MG 2.2 2.0       Significant Imaging: I have reviewed all pertinent imaging results/findings within the past 24 hours.

## 2019-03-21 NOTE — PROGRESS NOTES
"[facility] Hospital Medicine  Progress Note    Patient Name: Khang Patterson  MRN: [id number]  Patient Class: IP- Inpatient   Admission Date: 3/20/2019  Length of Stay: 0 days  Attending Physician: Osmani Montaño MD  Primary Care Provider: Leonides Muñoz MD        Subjective:     Principal Problem:Near syncope    HPI:  Khang Patterson is a 79 y/o male with a PMHx of HTN and RA, who presented to the ER today for evaluation secondary to Near Syncope and a Fall.  Patient was reportedly walking in the [employer] today when he became dizzy and fell, striking his head and right knee on the concrete.  Patient reports episodes of dizziness "on and off for about 3 weeks now where I have to hold onto things to keep from falling".  He is noted to have a lacerations to his nose and forehead and a hematoma above his right eye.  He denies CP, SOB, H/A, visual disturbances, N/V/D, abdominal pain, dysuria, and all other symptoms at this time.  Patient does report periods of hypoglycemia, but is uncertain if his blood glucose was low at the time of his fall.  No aggrevating or alleviating factors reported.  No prior treatment.  ER workup showed:  Stable VS, but positive orthostatics.  12 lead EKG showed Sinus rhythm with sinus arrhythmia with 1st degree A-V block.  CBC unremarkable.  D dimer elevated to 3.91.  CTA chest negative for PE.  BMP revealed Na of 128, KCL 2.9, Cl 90, .  Initial troponin negative.  CT head showed a small hematoma overlying the right side of the frontal bone.  No calvarial fracture or intracranial hemorrhage.  There is mild generalized cerebral and cerebellar atrophy.  There are chronic appearing ischemic changes in the deep white matter of both cerebral hemispheres.  There is no evidence of an acute ischemic event.  ECHO and Carotid US are pending.  Hospital Medicine called for admission.  He will be placed in OBS.  He is a Full Code.  His SDM is his son Adis who can " "be reached at [phone].    Hospital Course:  On 3/20 patient was placed in OBS secondary to Near Syncope, Fall, and Orthostatic Hypotension.  Patient reports a 3 week h/o dizziness "so bad I have to stop and hold onto things".  Patient reports he has never "passed out, or fallen before".  CT head showed a small hematoma overlying the right side of the frontal bone.  There is no calvarial fracture or intracranial hemorrhage.  There is mild generalized cerebral and cerebellar atrophy and chronic appearing ischemic changes in the deep white matter of both cerebral hemispheres.  There is no evidence of an acute ischemic event.  D dimer noted to be 3.91, but patient has a h/o RA.  CTA chest negative for PE.  12 lead EKG showed SR with 1st degree AVB.  Troponin negative.  KCl 2.9 for which he was given a total of 80 meq.  Na 128.  Patient's home Verapamil was held and he was started on IVFs.    As of 3/21 Carotid US negative.  ECHO pending.  Na improved to 130, KCL improved to 3.8.  Orthostatic hypotension persists, but dizziness is improving.  Continue IVFs.  Cardiology consult pending.  Will transition to inpatient for continued care.    Interval History:  No acute events overnight.  Resting comfortably in bed with wife at BS.  Carotid US negative.  ECHO pending.  Na improved to 130, KCL improved to 3.8.  Orthostatic hypotension persists, but dizziness is improving.  Continue IVFs.  Cardiology consult pending.  Will transition to inpatient for continued care.    Review of Systems   Constitutional: Negative for chills, diaphoresis, fatigue and fever.   HENT: Negative for hearing loss, mouth sores, sore throat, tinnitus and trouble swallowing.    Eyes: Negative for pain, discharge and redness.   Respiratory: Negative for apnea, cough, choking, chest tightness, shortness of breath, wheezing and stridor.    Cardiovascular: Negative for chest pain, palpitations and leg swelling.   Gastrointestinal: Negative for abdominal " distention, abdominal pain, blood in stool, constipation, diarrhea, nausea, rectal pain and vomiting.   Endocrine: Negative for cold intolerance, heat intolerance, polydipsia, polyphagia and polyuria.   Genitourinary: Negative for difficulty urinating, dysuria, flank pain, frequency, hematuria and urgency.   Musculoskeletal: Negative for arthralgias, back pain, gait problem, joint swelling, neck pain and neck stiffness.   Skin: Negative for color change, rash and wound.   Allergic/Immunologic: Negative for food allergies.   Neurological: Positive for dizziness and light-headedness. Negative for tremors, seizures, syncope, speech difficulty and headaches.   Hematological: Negative for adenopathy. Does not bruise/bleed easily.   Psychiatric/Behavioral: Negative for agitation and confusion. The patient is not nervous/anxious.    All other systems reviewed and are negative.    Objective:     Vital Signs (Most Recent):  Temp: 97.7 °F (36.5 °C) (03/21/19 0922)  Pulse: 61 (03/21/19 1250)  Resp: 18 (03/21/19 0922)  BP: (!) 123/58 (03/21/19 1250)  SpO2: (!) 92 % (03/21/19 0922) Vital Signs (24h Range):  Temp:  [97.7 °F (36.5 °C)-98.6 °F (37 °C)] 97.7 °F (36.5 °C)  Pulse:  [54-83] 61  Resp:  [16-24] 18  SpO2:  [92 %-97 %] 92 %  BP: ()/(48-90) 123/58     Weight: 68.5 kg (151 lb 0.2 oz)  Body mass index is 22.3 kg/m².    Intake/Output Summary (Last 24 hours) at 3/21/2019 1309  Last data filed at 3/20/2019 1550  Gross per 24 hour   Intake 1000 ml   Output --   Net 1000 ml      Physical Exam   Constitutional: He is oriented to person, place, and time. He appears well-developed and well-nourished. No distress.   HENT:   Head: Normocephalic.       Mouth/Throat: Oropharynx is clear and moist.   Eyes: Conjunctivae and EOM are normal. Pupils are equal, round, and reactive to light.       PERRLA, size 3 bilaterally with brisk response   Neck: Normal range of motion. Neck supple. No JVD present. Carotid bruit is not present.    Cardiovascular: Normal rate, regular rhythm, normal heart sounds and intact distal pulses. Exam reveals no gallop and no friction rub.   No murmur heard.  Pulmonary/Chest: Effort normal and breath sounds normal. No stridor. No respiratory distress. He has no wheezes. He has no rales. He exhibits no tenderness.   Abdominal: Soft. Bowel sounds are normal. He exhibits no distension and no mass. There is no tenderness. There is no rebound and no guarding.   Musculoskeletal: Normal range of motion. He exhibits no edema or tenderness.   Neurological: He is alert and oriented to person, place, and time. He has normal strength. No cranial nerve deficit or sensory deficit. GCS eye subscore is 4. GCS verbal subscore is 5. GCS motor subscore is 6.   Skin: Skin is warm and dry. No rash noted. He is not diaphoretic. No erythema.   Multiple abrasions/lacerations to right side of face, forehead, nose, and right knee.   Psychiatric: He has a normal mood and affect. His behavior is normal. Judgment and thought content normal.   Nursing note and vitals reviewed.      Significant Labs:   A1C:   Recent Labs   Lab 03/21/19  0500   HGBA1C 5.2     BMP:   Recent Labs   Lab 03/21/19  0500 03/21/19  1147    107   * 130*   K 3.3* 3.8   CL 97 97   CO2 23 27   BUN 19 16   CREATININE 0.8 0.9   CALCIUM 8.1* 8.2*   MG 2.0  --      CBC:   Recent Labs   Lab 03/20/19  1407 03/21/19  0500   WBC 8.14 9.32   HGB 13.8* 12.0*   HCT 38.2* 33.8*    197     Magnesium:   Recent Labs   Lab 03/20/19  1407 03/21/19  0500   MG 2.2 2.0       Significant Imaging: I have reviewed all pertinent imaging results/findings within the past 24 hours.    Assessment/Plan:      * Near syncope    - Transition to inpatient for continued care  - CT head showed a small hematoma overlying the right side of the frontal bone.  No calvarial fracture or intracranial hemorrhage.  There is mild generalized cerebral and cerebellar atrophy.  There are chronic  appearing ischemic changes in the deep white matter of both cerebral hemispheres.  There is no evidence of an acute ischemic event.   - Positive Orthostatics  - Hold home Verapamil for now  - Continuous IVFs  - Place MAX hose  - Carotid US negative  - ECHO pending  - Cardiology consult pending given degree of orthostasis with supine HTN  - Check orthostatics  - Neuro checks q 4 hours  - Tele monitoring       Orthostatic hypotension    - See plan as per above       Hematoma    - Secondary to fall PTA  - CT head showed a small hematoma overlying the right side of the frontal bone.  No calvarial fracture or intracranial hemorrhage.    - Continue local wound care  - No anticoagulation  - Monitor     Abrasion of face    - Continue local wound care  - Received tetanus vaccine in the ER       Hyponatremia    - Na improved from 128 to 130  - Continue NS at 125 ml/hour  - Daily BMP  - Monitor       Benign essential HTN    - Supine BP elevated, but patient with + orthostatics  - Hold home BP meds for now  - Hydralazine PRN SBP >180  - Monitor        Rheumatoid arthritis    - Hold home Methotrexate for now and resume at DC       Fall    - Secondary to #1  - PT/OT consults pending         VTE Risk Mitigation (From admission, onward)        Ordered     Place MAX hose  Until discontinued      03/20/19 1853     IP VTE HIGH RISK PATIENT  Once      03/20/19 1828     Place sequential compression device  Until discontinued      03/20/19 1828     Place MAX hose  Until discontinued     Hold AC given hematoma 03/20/19 1808              Jazmine Roach, MARJORIE, ACNP-BC  Department of Hospital Medicine   [facility]

## 2019-03-21 NOTE — ED NOTES
Pt resting comfortably in bed, awake, alert and oriented. Bed low, side rails up, and call light within reach. Spouse at BS.

## 2019-03-21 NOTE — HOSPITAL COURSE
"On 3/20 patient was placed in OBS secondary to Near Syncope, Fall, and Orthostatic Hypotension.  Patient reports a 3 week h/o dizziness "so bad I have to stop and hold onto things".  Patient reports he has never "passed out, or fallen before".  CT head showed a small hematoma overlying the right side of the frontal bone.  There is no calvarial fracture or intracranial hemorrhage.  There is mild generalized cerebral and cerebellar atrophy and chronic appearing ischemic changes in the deep white matter of both cerebral hemispheres.  There is no evidence of an acute ischemic event.  D dimer noted to be 3.91, but patient has a h/o RA.  CTA chest negative for PE.  12 lead EKG showed SR with 1st degree AVB.  Troponin negative.  KCl 2.9 for which he was given a total of 80 meq.  Na 128.  Patient's home Verapamil was held and he was started on IVFs.    As of 3/21 Carotid US negative.  ECHO pending.  Na improved to 130, KCL improved to 3.8.  Orthostatic hypotension persists, but dizziness is improving.  Continue IVFs.  Cardiology consult pending.  Will transition to inpatient for continued care.    As of 3/22 ECHO showed normal EF.  Second Troponin elevated to 0.38, third decreased to 0.193.  Patient is asymptomatic, denying CP and/or equivalent.  Currently denies dizziness.  Cardiology following and recommended resuming home Atenolol at 1/2 home dose.  Also recommended to stop HCTZ (which may be contributing to his hyponatremia), and started on Candasartan today.  Home Verapamil remains on hold.  Per cards, patient will need to f/u with them in clinic in 1 week to arrange Zio holter, and will need an EP consult.  He would also benefit from a stress test once facial wounds have healed.  Na 127.  Plan to continue IVFs overnight and anticipate DC home in AM if Na improves.  PT/OT has evaluated and recommended HH upon DC, which has been ordered with CM to arrange.  Patient seen seen and examined today prior to his discharge to " home.

## 2019-03-21 NOTE — SIGNIFICANT EVENT
Cardiology input appreciated.  Initial troponin on presentation negative.  Repeat today elevated to 0.38.  Patient is asymptomatic and denies CP and/or equivalent.  Case d/w Dr. Santillan who recommends repeating troponin in the morning.  No AC given small hematoma overlying the right side of the frontal bone.  Continue to monitor.

## 2019-03-21 NOTE — ASSESSMENT & PLAN NOTE
- KCL imrp  - Given 40 meq po in the ER, will give additional 40 meq at 2000  - Mag pending  - Daily BMP  - Monitor and replete as needed

## 2019-03-21 NOTE — PT/OT/SLP EVAL
Physical Therapy Evaluation    Patient Name:  Khang Patterson   MRN:  [id number]    Recommendations:     Discharge Recommendations:  home health PT, outpatient OT   Discharge Equipment Recommendations: (TBD, may need a RW)   Barriers to discharge: None    Assessment:     Khang Patterson is a 78 y.o. male admitted with a medical diagnosis of Near syncope.  He presents with the following impairments/functional limitations:  weakness, impaired endurance, gait instability, impaired functional mobilty, impaired self care skills, impaired balance, decreased lower extremity function, decreased coordination, decreased safety awareness.    Rehab Prognosis: Good; patient would benefit from acute skilled PT services to address these deficits and reach maximum level of function.    Recent Surgery: * No surgery found *      Plan:     During this hospitalization, patient to be seen 5 x/week(Pt to be seen a minimum of 5 out 7 days a week for skilled PT) to address the identified rehab impairments via gait training, therapeutic activities, therapeutic exercises and progress toward the following goals:    · Plan of Care Expires:  03/28/19    Subjective     Chief Complaint: falls  Patient/Family Comments/goals: to return to prior level  Pain/Comfort:  · Pain Rating 1: 0/10  · Pain Rating Post-Intervention 1: 0/10    Patients cultural, spiritual, Congregational conflicts given the current situation: no    Living Environment:  Pt lives alone in 1 story house with no steps. Pt reports that his friend is at his house or he is at her house every weekend, and that she will be helping him after discharge from facility.   Prior to admission, patients level of function was (I) with ADLs and Ambulated without AD. Pt drives and is retired.  Equipment used at home: none.  DME owned (not currently used): none.  Upon discharge, patient will have assistance from his friend.    Objective:     Communicated with Nurse Goff and epic chart review prior to  session.  Patient found supine with telemetry, peripheral IV  upon PT entry to room.    General Precautions: Standard, fall   Orthopedic Precautions:N/A   Braces: N/A     Exams:  · Cognitive Exam:  Patient is oriented to Person, Place, Time and Situation  · Postural Exam:  Patient presented with the following abnormalities:    · -       Rounded shoulders  · -       Forward head  · RLE ROM: WFL  · RLE Strength: 4/5 grossly  · LLE ROM: WFL  · LLE Strength: 4/5 grossly    Functional Mobility:  · Bed Mobility:     · Rolling Left:  stand by assistance  · Rolling Right: stand by assistance  · Scooting: stand by assistance  · Supine to Sit: minimum assistance  · Transfers:     · Sit to Stand:  contact guard assistance with no AD  · Bed to Chair: minimum assistance with  no AD  using  Step Transfer  · Toilet Transfer: minimum assistance with  no AD  using  Step Transfer  · Gait: ~25ft with Min A and no AD; pt had swaying and kept trying to hold onto items in the hallway. PT offered pt a RW and pt requested not to use it.   · Balance: Poor+ dynamic sitting balance; Poor+ dynamic standing balance   · Pt donned gown as a robe with Min A while sitting EOB. Pt lost balance while sitting EOB and required Min A to recover. Pt had urine accident during ambulation, therefore t/ed to toilet with Min A and performed hygiene tasks with Mod A. Pt t/f to bedside chair with Min A and donned gown with Min A in sitting, and socks with Mod A. Pt's IV began to actively bleed, therefore Nurse Goff was called to room to assess.         PT Eval completed as listed above. Pt and friend educated in role of PT and POC.     AM-PAC 6 CLICK MOBILITY  Total Score:17     Patient left up in chair with all lines intact, call button in reach, Nurse Goff notified and friend present.    GOALS:   Multidisciplinary Problems     Physical Therapy Goals        Problem: Physical Therapy Goal    Goal Priority Disciplines Outcome Goal Variances Interventions    Physical Therapy Goal     PT, PT/OT      Description:  LTGS to be met by 3/28/19  1. Pt will perform bed mobility with Supervision  2. Pt will perform sit<>stand functional t/fs with Supervision  3. Pt will ambulate ~150ft with/ without AD with Supervision  4. Pt will demo Good dynamic balance  5. Pt will tolerate 1 x 20 reps (B) LE ROM exercises                    History:     Past Medical History:   Diagnosis Date    Dizziness 3/20/2019    Hypertension     Hypokalemia 3/20/2019    Rheumatoid arthritis        Past Surgical History:   Procedure Laterality Date    PROSTATECTOMY         Time Tracking:     PT Received On: 03/21/19  PT Start Time: 1520     PT Stop Time: 1545  PT Total Time (min): 25 min     Billable Minutes: Evaluation 15 min and Therapeutic Activity 10 min      Marimar Greenberg, PT/OT  03/21/2019

## 2019-03-21 NOTE — H&P
"[facility] Hospital Medicine  History & Physical    Patient Name: Khang Patterson  MRN: [id number]  Admission Date: 3/20/2019  Attending Physician: No att. providers found   Primary Care Provider: Leonides Muñoz MD         Patient information was obtained from patient, relative(s) and ER records.     Subjective:     Principal Problem:Near syncope    Chief Complaint:   Chief Complaint   Patient presents with    Fall     ground level fall in parking lot. abrasion and lac to right side of face. denies LOC        HPI: Khang Patterson is a 77 y/o male with a PMHx of HTN and RA, who presented to the ER today for evaluation secondary to Near Syncope and a Fall.  Patient was reportedly walking in the [employer] parking lot today when he became dizzy and fell, striking his head and right knee on the concrete.  Patient reports episodes of dizziness "on and off for about 3 weeks now where I have to hold onto things to keep from falling".  He is noted to have a lacerations to his nose and forehead and a hematoma above his right eye.  He denies CP, SOB, H/A, visual disturbances, N/V/D, abdominal pain, dysuria, and all other symptoms at this time.  Patient does report periods of hypoglycemia, but is uncertain if his blood glucose was low at the time of his fall.  No aggrevating or alleviating factors reported.  No prior treatment.  ER workup showed:  Stable VS, but positive orthostatics.  12 lead EKG showed Sinus rhythm with sinus arrhythmia with 1st degree A-V block.  CBC unremarkable.  D dimer elevated to 3.91.  CTA chest negative for PE.  BMP revealed Na of 128, KCL 2.9, Cl 90, .  Initial troponin negative.  CT head showed a small hematoma overlying the right side of the frontal bone.  No calvarial fracture or intracranial hemorrhage.  There is mild generalized cerebral and cerebellar atrophy.  There are chronic appearing ischemic changes in the deep white matter of both cerebral hemispheres.  There is " no evidence of an acute ischemic event.  ECHO and Carotid US are pending.  Hospital Medicine called for admission.  He will be placed in OBS.  He is a Full Code.  His SDM is his son Adis who can be reached at [phone].    Past Medical History:   Diagnosis Date    Hypertension     Rheumatoid arthritis        No past surgical history on file.    Review of patient's allergies indicates:  No Known Allergies    No current facility-administered medications on file prior to encounter.      Current Outpatient Medications on File Prior to Encounter   Medication Sig    busPIRone (BUSPAR) 7.5 MG tablet Take 7.5 mg by mouth 2 (two) times daily.    methotrexate 2.5 MG Tab Take 2.5 mg by mouth every 7 days. Take 4 tablets every Wednesday    verapamil (CALAN-SR) 240 MG CR tablet Take 240 mg by mouth 2 (two) times daily.     Family History     None        Tobacco Use    Smoking status: Not on file   Substance and Sexual Activity    Alcohol use: Not on file    Drug use: Not on file    Sexual activity: Not on file     Review of Systems   Constitutional: Negative for chills, diaphoresis, fatigue and fever.   HENT: Negative for hearing loss, mouth sores, sore throat, tinnitus and trouble swallowing.    Eyes: Negative for pain, discharge and redness.   Respiratory: Negative for apnea, cough, choking, chest tightness, shortness of breath, wheezing and stridor.    Cardiovascular: Negative for chest pain, palpitations and leg swelling.   Gastrointestinal: Negative for abdominal distention, abdominal pain, blood in stool, constipation, diarrhea, nausea, rectal pain and vomiting.   Endocrine: Negative for cold intolerance, heat intolerance, polydipsia, polyphagia and polyuria.   Genitourinary: Negative for difficulty urinating, dysuria, flank pain, frequency, hematuria and urgency.   Musculoskeletal: Negative for arthralgias, back pain, gait problem, joint swelling, neck pain and neck stiffness.   Skin: Negative for color  change, rash and wound.   Allergic/Immunologic: Negative for food allergies.   Neurological: Positive for dizziness and light-headedness. Negative for tremors, seizures, syncope, speech difficulty and headaches.   Hematological: Negative for adenopathy. Does not bruise/bleed easily.   Psychiatric/Behavioral: Negative for agitation and confusion. The patient is not nervous/anxious.    All other systems reviewed and are negative.    Objective:     Vital Signs (Most Recent):  Temp: 98.6 °F (37 °C) (03/20/19 1510)  Pulse: 83 (03/20/19 1736)  Resp: 18 (03/20/19 1510)  BP: 122/65 (03/20/19 1736)  SpO2: 97 % (03/20/19 1510) Vital Signs (24h Range):  Temp:  [98.6 °F (37 °C)-98.8 °F (37.1 °C)] 98.6 °F (37 °C)  Pulse:  [59-83] 83  Resp:  [18] 18  SpO2:  [93 %-97 %] 97 %  BP: ()/(48-87) 122/65     Weight: (primary to weigh)  There is no height or weight on file to calculate BMI.    Physical Exam   Constitutional: He is oriented to person, place, and time. He appears well-developed and well-nourished. No distress.   HENT:   Head: Normocephalic.       Mouth/Throat: Oropharynx is clear and moist.   Eyes: Conjunctivae and EOM are normal. Pupils are equal, round, and reactive to light.       PERRLA, size 3 bilaterally with brisk response   Neck: Normal range of motion. Neck supple. No JVD present. Carotid bruit is not present.   Cardiovascular: Normal rate, regular rhythm, normal heart sounds and intact distal pulses. Exam reveals no gallop and no friction rub.   No murmur heard.  Pulmonary/Chest: Effort normal and breath sounds normal. No stridor. No respiratory distress. He has no wheezes. He has no rales. He exhibits no tenderness.   Abdominal: Soft. Bowel sounds are normal. He exhibits no distension and no mass. There is no tenderness. There is no rebound and no guarding.   Musculoskeletal: Normal range of motion. He exhibits no edema or tenderness.   Neurological: He is alert and oriented to person, place, and time. He  has normal strength. No cranial nerve deficit or sensory deficit. GCS eye subscore is 4. GCS verbal subscore is 5. GCS motor subscore is 6.   Skin: Skin is warm and dry. No rash noted. He is not diaphoretic. No erythema.   Multiple abrasions/lacerations to right side of face, forehead, nose, and right knee.   Psychiatric: He has a normal mood and affect. His behavior is normal. Judgment and thought content normal.   Nursing note and vitals reviewed.        CRANIAL NERVES     CN III, IV, VI   Pupils are equal, round, and reactive to light.  Extraocular motions are normal.        Significant Labs:   CBC:   Recent Labs   Lab 03/20/19  1407   WBC 8.14   HGB 13.8*   HCT 38.2*        CMP:   Recent Labs   Lab 03/20/19  1407   *   K 2.9*   CL 90*   CO2 26   *   BUN 32*   CREATININE 1.2   CALCIUM 9.0   PROT 7.0   ALBUMIN 3.7   BILITOT 1.2*   ALKPHOS 45*   AST 22   ALT 16   ANIONGAP 12   EGFRNONAA 57*     Cardiac Markers: No results for input(s): CKMB, MYOGLOBIN, BNP, TROPISTAT in the last 48 hours.  Troponin:   Recent Labs   Lab 03/20/19  1407   TROPONINI 0.021       Significant Imaging: I have reviewed all pertinent imaging results/findings within the past 24 hours.   Imaging Results          CTA Chest Non-Coronary (PE Study) (Final result)  Result time 03/20/19 16:59:54    Final result by Jaspreet Bernard MD (03/20/19 16:59:54)                 Impression:      No acute abnormality identified.  No evidence of pulmonary embolism.      Electronically signed by: Jaspreet Bernard  Date:    03/20/2019  Time:    16:59             Narrative:    EXAMINATION:  CTA CHEST NON CORONARY    CLINICAL HISTORY:  dizziness with elevated d-dimer;    TECHNIQUE:  Low dose axial images, sagittal and coronal reformations were obtained from the thoracic inlet to the lung bases following the IV administration of 100 mL of Omnipaque 350.  All CT scans at this location are performed using dose modulation techniques as appropriate to a  performed exam including the following: Automated exposure control; adjustment of the mA and/or kV  according to patient size.    COMPARISON:  Chest radiograph 03/20/2019    FINDINGS:  Base of Neck: No significant abnormality.    Thoracic soft tissues: Unremarkable.    Aorta: Left-sided aortic arch.  No aneurysm.  Advanced atherosclerotic calcification aorta and coronary arteries.    Heart: Moderate cardiomegaly.  No pericardial effusion.    Pulmonary vasculature: No evidence of pulmonary embolism.    Nidhi/Mediastinum: No pathologic jonathan enlargement.  Calcified mediastinal and hilar lymph nodes.    Airways: Mild bronchial wall thickening.    Lungs/Pleura: Subsegmental atelectasis posterior lung bases.  No pleural effusion or thickening.    Esophagus: Unremarkable.    Upper Abdomen: Moderate hiatal hernia.  Large bilateral renal cysts, 7 cm on the right, partially visualized 4.1 cm on the left.    Bones: No acute fracture. No suspicious lytic or sclerotic lesions.                               CT Head Without Contrast (Final result)  Result time 03/20/19 14:00:06    Final result by ABDULLAHI Trejo Sr., MD (03/20/19 14:00:06)                 Impression:      1. There is a small hematoma overlying the right side of the frontal bone.  2. There is no calvarial fracture or intracranial hemorrhage.  3. There is mild generalized cerebral and cerebellar atrophy. There are chronic appearing ischemic changes in the deep white matter of both cerebral hemispheres. There is no evidence of an acute ischemic event.  All CT scans at this facility use dose modulation, iterative reconstruction, and/or weight base dosing when appropriate to reduce radiation dose when appropriate to reduce radiation dose to as low as reasonably achievable.      Electronically signed by: Omar Trejo MD  Date:    03/20/2019  Time:    14:00             Narrative:    EXAMINATION:  CT HEAD WITHOUT CONTRAST    CLINICAL  HISTORY:  Dizziness;    TECHNIQUE:  Standard brain CT protocol without IV contrast was performed.    COMPARISON:  None    FINDINGS:  There is a small hematoma overlying the right side of the frontal bone.  There is no calvarial fracture or intracranial hemorrhage.  There is mild generalized cerebral and cerebellar atrophy.  There are chronic appearing ischemic changes in the deep white matter of both cerebral hemispheres.  There is no evidence of an acute ischemic event.  The paranasal sinuses are normal in appearance.                               X-Ray Chest PA And Lateral (Final result)  Result time 03/20/19 13:40:37    Final result by ABDULLAHI Trejo Sr., MD (03/20/19 13:40:37)                 Impression:      1. There is no evidence of an acute pulmonary process.  2. There is a 13 mm calcific density projected over the anterolateral aspect of the left 5th rib.  3. There is a mild amount of dextroconvex curvature of the thoracic spine. There are mild degenerative changes in the thoracic spine.  .      Electronically signed by: Omar Trejo MD  Date:    03/20/2019  Time:    13:40             Narrative:    EXAMINATION:  XR CHEST PA AND LATERAL    CLINICAL HISTORY:  Dizziness and giddiness    COMPARISON:  None    FINDINGS:  The size and contour of the heart are normal.  There is no evidence of an acute pulmonary process.  There is a 13 mm calcific density projected over the anterolateral aspect of the left 5th rib.  There is no pneumothorax or pleural effusion.  There are mild degenerative changes in the thoracic spine.  There is a mild amount of dextroconvex curvature of the thoracic spine.                              Assessment/Plan:     * Near syncope    - Placed in OBS  - CT head showed a small hematoma overlying the right side of the frontal bone.  No calvarial fracture or intracranial hemorrhage.  There is mild generalized cerebral and cerebellar atrophy.  There are chronic appearing ischemic changes in  the deep white matter of both cerebral hemispheres.  There is no evidence of an acute ischemic event.   - Positive orthostatic BP in the ER  - Hold home Verapamil for now  - Continuous IVFs  - Place MAX hose  - Carotid US and ECHO pending  - Check orthostatics  - Neuro checks q 4 hours  - Tele monitoring       Orthostatic hypotension    - See plan as per above       Hematoma    - Secondary to fall PTA  - CT head showed a small hematoma overlying the right side of the frontal bone.  No calvarial fracture or intracranial hemorrhage.    - Continue local wound care  - No anticoagulation  - Monitor     Abrasion of face    - Continue local wound care  - Received tetanus vaccine in the ER       Hyponatremia    - Na 128  - Continue NS at 125 ml/hour  - Daily BMP  - Monitor       Hypokalemia    - KCL 2.9  - Given 40 meq po in the ER, will give additional 40 meq at 2000  - Mag pending  - Daily BMP  - Monitor and replete as needed       Benign essential HTN    - Supine BP elevated, but patient with + orthostatics  - Hold home BP meds for now  - Hydralazine PRN SBP >180  - Monitor        Rheumatoid arthritis    - Hold home Methotrexate for now and resume at DC         VTE Risk Mitigation (From admission, onward)        Ordered     Place MAX hose  Until discontinued      03/20/19 1853     IP VTE HIGH RISK PATIENT  Once      03/20/19 1828     Place sequential compression device  Until discontinued      03/20/19 1828     Place MAX hose  Until discontinued      03/20/19 1808             Jazmine Roach, MARJORIE, ACNP-BC  Department of Hospital Medicine   [facility]

## 2019-03-21 NOTE — ED NOTES
Morning labs drawn. Pt resting in bed with eyes closed. NAD noted. AAO x 3. VSS. Will continue to monitor.

## 2019-03-21 NOTE — CONSULTS
[facility]   Cardiology  Consult Note    Patient Name: Khang Patterson  MRN: [id number]  Admission Date: 3/20/2019  Hospital Length of Stay: 0 days  Code Status: Full Code   Attending Provider: Osmani Montaño MD   Consulting Provider: Dilip Santillan MD  Primary Care Physician: Leonides Muñoz MD  Principal Problem:Near syncope    Patient information was obtained from patient, relative(s), past medical records and ER records.     Inpatient consult to Cardiology  Consult performed by: Dilip Santillan MD  Consult ordered by: aJzmine Roach NP  Reason for consult: NEAR SYNCOPE        Subjective:     Chief Complaint:  FALLS     HPI:   Pt had near syncope at [employer] yesterday.  He felt dizzy walking inside store, fell down.  He then got up and near his car, he again felt dizzy and fell down on face.  No clear syncope.   Denies chest pain sxs or unusual dyspnea.  Has had chronic intermittent dizziness, imbalance.  Denies h/o cad, chf, mi/cva.  ecg on admit NSR, LAFB 1 av block, anterior T wave inversions. No old ecg to compare.  Echo today shows normal LV function.  Troponin only checked once on admit and was negative.  His potassium and sodium were significantly low on admit and now improved with repletion, IV fluids.  His HTN meds have been held.    CTA chest for abnl d-dimer showed no PE.  Carotid u/s no significant disease.  He has been on high dose Verapamil 240 mg bid and Atenolol and HCTZ.      Past Medical History:   Diagnosis Date    Dizziness 3/20/2019    Hypertension     Hypokalemia 3/20/2019    Rheumatoid arthritis        Past Surgical History:   Procedure Laterality Date    PROSTATECTOMY         Review of patient's allergies indicates:  No Known Allergies    No current facility-administered medications on file prior to encounter.      Current Outpatient Medications on File Prior to Encounter   Medication Sig    aspirin (ECOTRIN) 81 MG EC tablet Take 81 mg by mouth once daily.     atenolol (TENORMIN) 50 MG tablet Take 50 mg by mouth once daily.    busPIRone (BUSPAR) 7.5 MG tablet Take 7.5 mg by mouth 2 (two) times daily.    fluticasone (FLONASE) 50 mcg/actuation nasal spray 1 spray by Nasal route.    hydroCHLOROthiazide (HYDRODIURIL) 25 MG tablet Take 25 mg by mouth once daily.    hydroxychloroquine (PLAQUENIL) 200 mg tablet Take by mouth once daily.    ipratropium (ATROVENT) 42 mcg (0.06 %) nasal spray 2 sprays by Nasal route.    methotrexate 2.5 MG Tab Take 2.5 mg by mouth every 7 days. Take 4 tablets every Wednesday    verapamil (CALAN-SR) 240 MG CR tablet Take 240 mg by mouth 2 (two) times daily.     Family History     None        Tobacco Use    Smoking status: Never Smoker   Substance and Sexual Activity    Alcohol use: Yes     Comment: social    Drug use: No    Sexual activity: Not on file     Review of Systems   Cardiovascular: Positive for near-syncope.   Skin: Positive for color change.   Neurological: Positive for dizziness, light-headedness and loss of balance.     Objective:     Vital Signs (Most Recent):  Temp: 97.7 °F (36.5 °C) (03/21/19 0922)  Pulse: 61 (03/21/19 1250)  Resp: 18 (03/21/19 0922)  BP: (!) 123/58 (03/21/19 1250)  SpO2: (!) 92 % (03/21/19 0922) Vital Signs (24h Range):  Temp:  [97.7 °F (36.5 °C)-98.6 °F (37 °C)] 97.7 °F (36.5 °C)  Pulse:  [54-83] 61  Resp:  [16-24] 18  SpO2:  [92 %-97 %] 92 %  BP: (116-174)/(58-90) 123/58     Weight: 68.5 kg (151 lb 0.2 oz)  Body mass index is 22.3 kg/m².    SpO2: (!) 92 %  O2 Device (Oxygen Therapy): room air      Intake/Output Summary (Last 24 hours) at 3/21/2019 1443  Last data filed at 3/20/2019 1550  Gross per 24 hour   Intake 1000 ml   Output --   Net 1000 ml       Lines/Drains/Airways     Peripheral Intravenous Line                 Peripheral IV - Single Lumen 03/21/19 0720 Left Hand less than 1 day                Physical Exam   Constitutional: He is oriented to person, place, and time. He appears  well-developed and well-nourished.   HENT:   Head: Normocephalic.   Neck: Normal range of motion. Neck supple. Normal carotid pulses, no hepatojugular reflux and no JVD present. Carotid bruit is not present. No thyromegaly present.   Cardiovascular: Normal rate, regular rhythm, S1 normal and S2 normal. PMI is not displaced. Exam reveals no S3, no S4, no distant heart sounds, no friction rub, no midsystolic click and no opening snap.   No murmur heard.  Pulses:       Radial pulses are 2+ on the right side, and 2+ on the left side.   Pulmonary/Chest: Effort normal and breath sounds normal. He has no wheezes. He has no rales.   Abdominal: Soft. Bowel sounds are normal. He exhibits no distension, no abdominal bruit, no ascites and no mass. There is no tenderness.   Musculoskeletal: He exhibits no edema.   Neurological: He is alert and oriented to person, place, and time.   Skin: Skin is warm.   LARGE RIGHT ORBITAL HEMATOMA NOTED   Psychiatric: He has a normal mood and affect. His behavior is normal.   Nursing note and vitals reviewed.      Significant Labs:   BMP:   Recent Labs   Lab 03/20/19  1407 03/21/19  0500 03/21/19  1147   * 110 107   * 128* 130*   K 2.9* 3.3* 3.8   CL 90* 97 97   CO2 26 23 27   BUN 32* 19 16   CREATININE 1.2 0.8 0.9   CALCIUM 9.0 8.1* 8.2*   MG 2.2 2.0  --    , CMP   Recent Labs   Lab 03/20/19  1407 03/21/19  0500 03/21/19  1147   * 128* 130*   K 2.9* 3.3* 3.8   CL 90* 97 97   CO2 26 23 27   * 110 107   BUN 32* 19 16   CREATININE 1.2 0.8 0.9   CALCIUM 9.0 8.1* 8.2*   PROT 7.0  --   --    ALBUMIN 3.7  --   --    BILITOT 1.2*  --   --    ALKPHOS 45*  --   --    AST 22  --   --    ALT 16  --   --    ANIONGAP 12 8 6*   ESTGFRAFRICA >60 >60 >60   EGFRNONAA 57* >60 >60   , CBC   Recent Labs   Lab 03/20/19  1407 03/21/19  0500   WBC 8.14 9.32   HGB 13.8* 12.0*   HCT 38.2* 33.8*    197   , INR No results for input(s): INR, PROTIME in the last 48 hours. and Troponin    Recent Labs   Lab 03/20/19  1407   TROPONINI 0.021       Significant Imaging: Echocardiogram:   2D echo with color flow doppler:   Results for orders placed or performed during the hospital encounter of 03/20/19   2D echo with color flow doppler   Result Value Ref Range    QEF 60 55 - 65    Mitral Valve Regurgitation MILD     Aortic Valve Regurgitation MILD     Tricuspid Valve Regurgitation MILD      Assessment and Plan:     Near syncope likely due to volume depletion, possibly orthostatic hypotension.  Significant orbital hematoma as consequence of fall.  Keep pt off HCTZ.  Cut Verapamil back in 1/2 to 240 mg qd from bid (too strong of dose for this elderly pt with conduction disease).  Ok to stay on Atenolol at current dose.  IV fluids  Replete electrolytes  Plan for outpt 2 week Zio Holter and EP consultation.  Also outpt stress MPI once he recovers from his injuries.  Would check troponin # 2  Fall precautions discussed, probably needs walker?  F/u [facility] clinic 1 week.    * Near syncope    See management plan detailed above.      Abnormal ECG    See management plan detailed above.      Benign essential HTN    See management plan detailed above.      Orthostatic hypotension    See management plan detailed above.      Hyponatremia    See management plan detailed above.      Hematoma    See management plan detailed above.      Abrasion of face    See management plan detailed above.          VTE Risk Mitigation (From admission, onward)        Ordered     Place MAX hose  Until discontinued      03/20/19 1853     IP VTE HIGH RISK PATIENT  Once      03/20/19 1828     Place sequential compression device  Until discontinued      03/20/19 1828     Place MAX hose  Until discontinued      03/20/19 1808          Thank you for your consult.    Dilip Santillan MD  Cardiology   [facility]

## 2019-03-21 NOTE — ED NOTES
Report received from Yamileth GALVAN. Pt resting in bed. NAD noted. RR e/u, airway open and patent. AAO x 3. Will resume care.

## 2019-03-21 NOTE — ASSESSMENT & PLAN NOTE
- KCL 2.9  - Given 40 meq po in the ER, will give additional 40 meq at 2000  - Mag pending  - Daily BMP  - Monitor and replete as needed

## 2019-03-21 NOTE — ASSESSMENT & PLAN NOTE
- Transition to inpatient for continued care  - CT head showed a small hematoma overlying the right side of the frontal bone.  No calvarial fracture or intracranial hemorrhage.  There is mild generalized cerebral and cerebellar atrophy.  There are chronic appearing ischemic changes in the deep white matter of both cerebral hemispheres.  There is no evidence of an acute ischemic event.   - Positive Orthostatics  - Hold home Verapamil for now  - Continuous IVFs  - Place MAX hose  - Carotid US negative  - ECHO pending  - Cardiology consult pending given degree of orthostasis with supine HTN  - Check orthostatics  - Neuro checks q 4 hours  - Tele monitoring

## 2019-03-21 NOTE — SUBJECTIVE & OBJECTIVE
Past Medical History:   Diagnosis Date    Dizziness 3/20/2019    Hypertension     Hypokalemia 3/20/2019    Rheumatoid arthritis        Past Surgical History:   Procedure Laterality Date    PROSTATECTOMY         Review of patient's allergies indicates:  No Known Allergies    No current facility-administered medications on file prior to encounter.      Current Outpatient Medications on File Prior to Encounter   Medication Sig    aspirin (ECOTRIN) 81 MG EC tablet Take 81 mg by mouth once daily.    atenolol (TENORMIN) 50 MG tablet Take 50 mg by mouth once daily.    busPIRone (BUSPAR) 7.5 MG tablet Take 7.5 mg by mouth 2 (two) times daily.    fluticasone (FLONASE) 50 mcg/actuation nasal spray 1 spray by Nasal route.    hydroCHLOROthiazide (HYDRODIURIL) 25 MG tablet Take 25 mg by mouth once daily.    hydroxychloroquine (PLAQUENIL) 200 mg tablet Take by mouth once daily.    ipratropium (ATROVENT) 42 mcg (0.06 %) nasal spray 2 sprays by Nasal route.    methotrexate 2.5 MG Tab Take 2.5 mg by mouth every 7 days. Take 4 tablets every Wednesday    verapamil (CALAN-SR) 240 MG CR tablet Take 240 mg by mouth 2 (two) times daily.     Family History     None        Tobacco Use    Smoking status: Never Smoker   Substance and Sexual Activity    Alcohol use: Yes     Comment: social    Drug use: No    Sexual activity: Not on file     Review of Systems   Cardiovascular: Positive for near-syncope.   Skin: Positive for color change.   Neurological: Positive for dizziness, light-headedness and loss of balance.     Objective:     Vital Signs (Most Recent):  Temp: 97.7 °F (36.5 °C) (03/21/19 0922)  Pulse: 61 (03/21/19 1250)  Resp: 18 (03/21/19 0922)  BP: (!) 123/58 (03/21/19 1250)  SpO2: (!) 92 % (03/21/19 0922) Vital Signs (24h Range):  Temp:  [97.7 °F (36.5 °C)-98.6 °F (37 °C)] 97.7 °F (36.5 °C)  Pulse:  [54-83] 61  Resp:  [16-24] 18  SpO2:  [92 %-97 %] 92 %  BP: (116-174)/(58-90) 123/58     Weight: 68.5 kg (151 lb 0.2  oz)  Body mass index is 22.3 kg/m².    SpO2: (!) 92 %  O2 Device (Oxygen Therapy): room air      Intake/Output Summary (Last 24 hours) at 3/21/2019 1444  Last data filed at 3/20/2019 1550  Gross per 24 hour   Intake 1000 ml   Output --   Net 1000 ml       Lines/Drains/Airways     Peripheral Intravenous Line                 Peripheral IV - Single Lumen 03/21/19 0720 Left Hand less than 1 day                Physical Exam   Constitutional: He is oriented to person, place, and time. He appears well-developed and well-nourished.   HENT:   Head: Normocephalic.   Neck: Normal range of motion. Neck supple. Normal carotid pulses, no hepatojugular reflux and no JVD present. Carotid bruit is not present. No thyromegaly present.   Cardiovascular: Normal rate, regular rhythm, S1 normal and S2 normal. PMI is not displaced. Exam reveals no S3, no S4, no distant heart sounds, no friction rub, no midsystolic click and no opening snap.   No murmur heard.  Pulses:       Radial pulses are 2+ on the right side, and 2+ on the left side.   Pulmonary/Chest: Effort normal and breath sounds normal. He has no wheezes. He has no rales.   Abdominal: Soft. Bowel sounds are normal. He exhibits no distension, no abdominal bruit, no ascites and no mass. There is no tenderness.   Musculoskeletal: He exhibits no edema.   Neurological: He is alert and oriented to person, place, and time.   Skin: Skin is warm.   LARGE RIGHT ORBITAL HEMATOMA NOTED   Psychiatric: He has a normal mood and affect. His behavior is normal.   Nursing note and vitals reviewed.      Significant Labs:   BMP:   Recent Labs   Lab 03/20/19  1407 03/21/19  0500 03/21/19  1147   * 110 107   * 128* 130*   K 2.9* 3.3* 3.8   CL 90* 97 97   CO2 26 23 27   BUN 32* 19 16   CREATININE 1.2 0.8 0.9   CALCIUM 9.0 8.1* 8.2*   MG 2.2 2.0  --    , CMP   Recent Labs   Lab 03/20/19  1407 03/21/19  0500 03/21/19  1147   * 128* 130*   K 2.9* 3.3* 3.8   CL 90* 97 97   CO2 26 23 27    * 110 107   BUN 32* 19 16   CREATININE 1.2 0.8 0.9   CALCIUM 9.0 8.1* 8.2*   PROT 7.0  --   --    ALBUMIN 3.7  --   --    BILITOT 1.2*  --   --    ALKPHOS 45*  --   --    AST 22  --   --    ALT 16  --   --    ANIONGAP 12 8 6*   ESTGFRAFRICA >60 >60 >60   EGFRNONAA 57* >60 >60   , CBC   Recent Labs   Lab 03/20/19  1407 03/21/19  0500   WBC 8.14 9.32   HGB 13.8* 12.0*   HCT 38.2* 33.8*    197   , INR No results for input(s): INR, PROTIME in the last 48 hours. and Troponin   Recent Labs   Lab 03/20/19  1407   TROPONINI 0.021       Significant Imaging: Echocardiogram:   2D echo with color flow doppler:   Results for orders placed or performed during the hospital encounter of 03/20/19   2D echo with color flow doppler   Result Value Ref Range    QEF 60 55 - 65    Mitral Valve Regurgitation MILD     Aortic Valve Regurgitation MILD     Tricuspid Valve Regurgitation MILD

## 2019-03-21 NOTE — ASSESSMENT & PLAN NOTE
- Supine BP elevated, but patient with + orthostatics  - Hold home BP meds for now  - Hydralazine PRN SBP >180  - Monitor

## 2019-03-21 NOTE — PLAN OF CARE
Problem: Physical Therapy Goal  Goal: Physical Therapy Goal  LTGS to be met by 3/28/19  1. Pt will perform bed mobility with Supervision  2. Pt will perform sit<>stand functional t/fs with Supervision  3. Pt will ambulate ~150ft with/ without AD with Supervision  4. Pt will demo Good dynamic balance  5. Pt will tolerate 1 x 20 reps (B) LE ROM exercises  PT EVAL completed 3/21/19

## 2019-03-22 ENCOUNTER — TELEPHONE (OUTPATIENT)
Dept: CARDIOLOGY | Facility: HOSPITAL | Age: 79
End: 2019-03-22

## 2019-03-22 PROBLEM — R79.89 ELEVATED TROPONIN: Status: ACTIVE | Noted: 2019-03-22

## 2019-03-22 PROBLEM — S01.81XA LACERATION OF FOREHEAD: Status: ACTIVE | Noted: 2019-03-22

## 2019-03-22 LAB
ANION GAP SERPL CALC-SCNC: 7 MMOL/L
BASOPHILS # BLD AUTO: 0.02 K/UL
BASOPHILS NFR BLD: 0.2 %
BUN SERPL-MCNC: 12 MG/DL
CALCIUM SERPL-MCNC: 7.9 MG/DL
CHLORIDE SERPL-SCNC: 96 MMOL/L
CO2 SERPL-SCNC: 24 MMOL/L
CREAT SERPL-MCNC: 0.8 MG/DL
DIFFERENTIAL METHOD: ABNORMAL
EOSINOPHIL # BLD AUTO: 0.1 K/UL
EOSINOPHIL NFR BLD: 1.6 %
ERYTHROCYTE [DISTWIDTH] IN BLOOD BY AUTOMATED COUNT: 12.7 %
EST. GFR  (AFRICAN AMERICAN): >60 ML/MIN/1.73 M^2
EST. GFR  (NON AFRICAN AMERICAN): >60 ML/MIN/1.73 M^2
GLUCOSE SERPL-MCNC: 101 MG/DL
HCT VFR BLD AUTO: 31.6 %
HGB BLD-MCNC: 11.2 G/DL
LYMPHOCYTES # BLD AUTO: 0.7 K/UL
LYMPHOCYTES NFR BLD: 7.7 %
MAGNESIUM SERPL-MCNC: 1.7 MG/DL
MCH RBC QN AUTO: 32.3 PG
MCHC RBC AUTO-ENTMCNC: 35.4 G/DL
MCV RBC AUTO: 91 FL
MONOCYTES # BLD AUTO: 1 K/UL
MONOCYTES NFR BLD: 11.7 %
NEUTROPHILS # BLD AUTO: 6.7 K/UL
NEUTROPHILS NFR BLD: 78.8 %
PLATELET # BLD AUTO: 177 K/UL
PMV BLD AUTO: 9.9 FL
POTASSIUM SERPL-SCNC: 3.1 MMOL/L
RBC # BLD AUTO: 3.47 M/UL
SODIUM SERPL-SCNC: 127 MMOL/L
TROPONIN I SERPL DL<=0.01 NG/ML-MCNC: 0.19 NG/ML
WBC # BLD AUTO: 8.54 K/UL

## 2019-03-22 PROCEDURE — 25000003 PHARM REV CODE 250: Performed by: INTERNAL MEDICINE

## 2019-03-22 PROCEDURE — 25000003 PHARM REV CODE 250: Performed by: NURSE PRACTITIONER

## 2019-03-22 PROCEDURE — 94761 N-INVAS EAR/PLS OXIMETRY MLT: CPT

## 2019-03-22 PROCEDURE — 97110 THERAPEUTIC EXERCISES: CPT

## 2019-03-22 PROCEDURE — 99233 PR SUBSEQUENT HOSPITAL CARE,LEVL III: ICD-10-PCS | Mod: ,,, | Performed by: INTERNAL MEDICINE

## 2019-03-22 PROCEDURE — 97165 OT EVAL LOW COMPLEX 30 MIN: CPT

## 2019-03-22 PROCEDURE — 84484 ASSAY OF TROPONIN QUANT: CPT

## 2019-03-22 PROCEDURE — 80048 BASIC METABOLIC PNL TOTAL CA: CPT

## 2019-03-22 PROCEDURE — 36415 COLL VENOUS BLD VENIPUNCTURE: CPT

## 2019-03-22 PROCEDURE — 83735 ASSAY OF MAGNESIUM: CPT

## 2019-03-22 PROCEDURE — 99233 SBSQ HOSP IP/OBS HIGH 50: CPT | Mod: ,,, | Performed by: INTERNAL MEDICINE

## 2019-03-22 PROCEDURE — 21400001 HC TELEMETRY ROOM

## 2019-03-22 PROCEDURE — 25000003 PHARM REV CODE 250: Performed by: REGISTERED NURSE

## 2019-03-22 PROCEDURE — 85025 COMPLETE CBC W/AUTO DIFF WBC: CPT

## 2019-03-22 PROCEDURE — 97116 GAIT TRAINING THERAPY: CPT

## 2019-03-22 PROCEDURE — 97530 THERAPEUTIC ACTIVITIES: CPT

## 2019-03-22 RX ORDER — ATENOLOL 25 MG/1
25 TABLET ORAL DAILY
Status: DISCONTINUED | OUTPATIENT
Start: 2019-03-22 | End: 2019-03-23 | Stop reason: HOSPADM

## 2019-03-22 RX ORDER — POTASSIUM CHLORIDE 20 MEQ/1
40 TABLET, EXTENDED RELEASE ORAL ONCE
Status: COMPLETED | OUTPATIENT
Start: 2019-03-22 | End: 2019-03-22

## 2019-03-22 RX ORDER — CANDESARTAN 4 MG/1
4 TABLET ORAL DAILY
Status: DISCONTINUED | OUTPATIENT
Start: 2019-03-22 | End: 2019-03-23 | Stop reason: HOSPADM

## 2019-03-22 RX ADMIN — HYPROMELLOSE 2910 1 DROP: 5 SOLUTION OPHTHALMIC at 03:03

## 2019-03-22 RX ADMIN — ATENOLOL 25 MG: 25 TABLET ORAL at 11:03

## 2019-03-22 RX ADMIN — SODIUM CHLORIDE: 0.9 INJECTION, SOLUTION INTRAVENOUS at 06:03

## 2019-03-22 RX ADMIN — CANDESARTAN CILEXETIL 4 MG: 4 TABLET ORAL at 11:03

## 2019-03-22 RX ADMIN — HYPROMELLOSE 2910 1 DROP: 5 SOLUTION OPHTHALMIC at 08:03

## 2019-03-22 RX ADMIN — POTASSIUM CHLORIDE 40 MEQ: 1500 TABLET, EXTENDED RELEASE ORAL at 09:03

## 2019-03-22 RX ADMIN — SODIUM CHLORIDE: 0.9 INJECTION, SOLUTION INTRAVENOUS at 03:03

## 2019-03-22 NOTE — PLAN OF CARE
Problem: Physical Therapy Goal  Goal: Physical Therapy Goal  LTGS to be met by 3/28/19  1. Pt will perform bed mobility with Supervision  2. Pt will perform sit<>stand functional t/fs with Supervision  3. Pt will ambulate ~150ft with/ without AD with Supervision  4. Pt will demo Good dynamic balance  5. Pt will tolerate 1 x 20 reps (B) LE ROM exercises   Outcome: Ongoing (interventions implemented as appropriate)  PATIENT DID VERY WELL WITH GT IN HALLWAY , T/FS TO B/S CHAIR.

## 2019-03-22 NOTE — PT/OT/SLP EVAL
Occupational Therapy   Evaluation    Name: Khang Patterson  MRN: [id number]  Admitting Diagnosis:  Near syncope      Recommendations:     Discharge Recommendations: home health OT(home health for safety)  Discharge Equipment Recommendations:  (tbd)  Barriers to discharge:  Decreased caregiver support    Assessment:     Khang Patterson is a 78 y.o. male with a medical diagnosis of Near syncope.  He presents with debility and generalized weakness. Performance deficits affecting function: weakness, impaired functional mobilty, impaired balance, impaired endurance, impaired self care skills, gait instability, decreased upper extremity function, decreased safety awareness.      Rehab Prognosis: Good; patient would benefit from acute skilled OT services to address these deficits and reach maximum level of function.       Plan:     Patient to be seen 3 x/week to address the above listed problems via self-care/home management, therapeutic activities, therapeutic exercises  · Plan of Care Expires: 03/29/19  · Plan of Care Reviewed with: patient    Subjective     Chief Complaint: debility and generalized weakness  Patient/Family Comments/goals:     Occupational Profile:  Living Environment: lives alone in 1 story no steps to enter  Previous level of function: (I) with adl's and functional mobility  Roles and Routines: occupational therapy  Equipment Used at Home:  none  Assistance upon Discharge:     Pain/Comfort:  · Pain Rating 1: 0/10    Patients cultural, spiritual, Jew conflicts given the current situation:      Objective:     Communicated with: nurse and epic chart review prior to session.  Patient found HOB elevated with telemetry, peripheral IV upon OT entry to room.    General Precautions: Standard, fall   Orthopedic Precautions:N/A   Braces: N/A(na)     Occupational Performance:    Bed Mobility:    · Patient completed Rolling/Turning to Left with  minimum assistance  · Patient completed Rolling/Turning to Right  with minimum assistance  · Patient completed Scooting/Bridging with minimum assistance  · Patient completed Supine to Sit with minimum assistance  · Patient completed Sit to Supine with minimum assistance    Functional Mobility/Transfers:  · Patient completed Sit <> Stand Transfer with minimum assistance  with  hand-held assist   · Functional Mobility: pt req min a with side stepping r>l a few steps    Activities of Daily Living:  · Upper Body Dressing: minimum assistance .  · Lower Body Dressing: minimum assistance .    Cognitive/Visual Perceptual:  Cognitive/Psychosocial Skills:     -       Oriented to: Person, Place, Time and Situation   -       Follows Commands/attention:Follows multistep  commands  -       Communication: clear/fluent  -       Memory: No Deficits noted  -       Safety awareness/insight to disability: impaired   Visual/Perceptual:      -Intact .    Physical Exam:  Upper Extremity Range of Motion:     -       Right Upper Extremity: WFL  -       Left Upper Extremity: WFL  Upper Extremity Strength:    -       Right Upper Extremity: Deficits: mmt: 3/5 grossly  -       Left Upper Extremity: Deficits: mmt: 3/5 grossly   Strength:    -       Right Upper Extremity: WFL  -       Left Upper Extremity: WFL    AMPAC 6 Click ADL:  AMPAC Total Score: 24    Treatment & Education:    Education:    Patient left up in chair with all lines intact, call button in reach, bed alarm on, nurse notified and girlfriend present    GOALS:   Multidisciplinary Problems     Occupational Therapy Goals        Problem: Occupational Therapy Goal    Goal Priority Disciplines Outcome Interventions   Occupational Therapy Goal     OT, PT/OT     Description:  ot goals to roni met 3-29-19  Pt will tolerate 1 set x 15 reps b ue rom exercise with min resistance  sba with toilet t/f's  sba with le dressing                      History:     Past Medical History:   Diagnosis Date    Dizziness 3/20/2019    Hypertension     Hypokalemia  3/20/2019    Rheumatoid arthritis        Past Surgical History:   Procedure Laterality Date    PROSTATECTOMY         Time Tracking:     OT Date of Treatment: 03/22/19  OT Start Time: 1442  OT Stop Time: 1510  OT Total Time (min): 28 min    Billable Minutes:Evaluation 14 minutes  Therapeutic Activity 14 minutes    Jasmine Ponce OT  3/22/2019

## 2019-03-22 NOTE — HOSPITAL COURSE
3/22/19-Patient seen and examined today, ambulating with therapy. Still fatigued but feeling stronger. No chest pain or SOB. BP higher side. Pulse stable overnight. Labs reviewed, Na 127, K 3.1. Troponin trending down. Echo showed normal EF.

## 2019-03-22 NOTE — PLAN OF CARE
Met with patient and family. Patient is independent with adls and iadls. Patient lives alone but has a very good friend who was present, She is very supportive and indicated that she will provide patient's transportation home. Discussed with patient therapy recommends home health. Reviewed copy of laminated list. Preference letter obtained. Referral faxed via [facility].. CM to follow.  Updated white board with 's name and number. Transitional Care Folder, Discharge Planning Begins on Admission pamphlet, [facility] Bedside Delivery pamphlet, Advance Directive information given to patient along with the contact information given.Instructed patient or family to call with any questions or concerns.    Discussed accessing the [employer] via the [employer] Instant Activation. Patient agreed. Confirmed telephone number. Activation link sent.          No Pharmacies Listed  Leonides Muñoz MD  Payor: [employer] MANAGED MEDICARE / Plan: [employer] MEDICARE HMO / Product Type: Capitation /           03/22/19 1152   Discharge Assessment   Assessment Type Discharge Planning Assessment   Confirmed/corrected address and phone number on facesheet? Yes   Assessment information obtained from? Patient;Caregiver;Medical Record   Expected Length of Stay (days) (tbd)   Communicated expected length of stay with patient/caregiver yes   Prior to hospitilization cognitive status: Alert/Oriented   Prior to hospitalization functional status: Independent   Current cognitive status: Alert/Oriented   Current Functional Status: Needs Assistance   Facility Arrived From: home   Lives With alone   Able to Return to Prior Arrangements yes   Is patient able to care for self after discharge? Yes   Who are your caregiver(s) and their phone number(s)? Maia Mendoza ( friend ) [phone]   Patient's perception of discharge disposition acute care hospital;home or selfcare;home health   Readmission Within the Last 30 Days no previous admission in  last 30 days   Patient currently being followed by outpatient case management? No   Equipment Currently Used at Home none   Do you have any problems affording any of your prescribed medications? No   Is the patient taking medications as prescribed? yes   Does the patient have transportation home? Yes   Transportation Anticipated family or friend will provide   Does the patient receive services at the [facility]? No   Discharge Plan A Home;Home with family;Home Health   Discharge Plan B Home;Home with family   DME Needed Upon Discharge  walker, standard;walker, rolling   Patient/Family in Agreement with Plan yes

## 2019-03-22 NOTE — ASSESSMENT & PLAN NOTE
- Initial troponin negative, repeat 0.387, 0.193  - Denies CP and/or equivalent   - No AC given hematoma  - Cardiology following; will need outpatient f/u for Zio patch, referral to EP, and stress test once facial wounds have healed  - tele monitoring

## 2019-03-22 NOTE — PLAN OF CARE
03/22/19 1200   Post-Acute Status   Post-Acute Authorization Home Health/Hospice   Home Health/Hospice Status Referrals Sent

## 2019-03-22 NOTE — ASSESSMENT & PLAN NOTE
- Transition to inpatient for continued care  - CT head showed a small hematoma overlying the right side of the frontal bone.  No calvarial fracture or intracranial hemorrhage.  There is mild generalized cerebral and cerebellar atrophy.  There are chronic appearing ischemic changes in the deep white matter of both cerebral hemispheres.  There is no evidence of an acute ischemic event.   - Positive Orthostatics  - Cardiology following and recommends outpatient Zio monitor, EP evaluation, and stress test.   - Hold home Verapamil and HCTZ   - Continuous IVFs  - Place MAX hose  - Carotid US negative  - ECHO normal  - Monitor orthostatics  - Neuro checks q 4 hours  - PT/OT evaluated and recommended home with HH; CM following to arrange  - Tele monitoring  - Anticipate DC home in the AM

## 2019-03-22 NOTE — PT/OT/SLP PROGRESS
Physical Therapy  Treatment    Khang Patterson   MRN: [id number]   Admitting Diagnosis: Near syncope    PT Received On: 03/22/19  PT Start Time: 0845     PT Stop Time: 0910    PT Total Time (min): 25 min       Billable Minute: gt 15, te 10    Treatment Type: Treatment  PT/PTA: PTA     PTA Visit Number: 1       General Precautions: Standard, fall  Orthopedic Precautions: N/A   Braces: N/A    Spiritual, Cultural Beliefs, Voodoo Practices, Values that Affect Care: no    Subjective:  Communicated with NURSE, SANJUANITA AND Epic CHART REVIEW  prior to session.  PATIENT AGREE TO TX NOW.    Pain/Comfort  Pain Rating 1: 0/10    Objective:   Patient found with: peripheral IV, telemetry, SUPINE IN BED. ASSISTED OOB T/FS TO B/S CHAIR , GT IN HALLWAY , STARR LE INSTRUCTION SHORTSEATED.    Functional Mobility:  Bed Mobility:    SUPINE TO SIT AT CGA ASSIST TO MIN ASSIST X1 , PATIENT WITH POSTERIOR LEAN AT TIMES, UNSTEADY.    Transfers:   SIT TO STAND, STAND TO SIT AT CGAX1.    Gait:    AMBULATE IN HALLWAY AT CGAX1 WITH RW AT 75'X2, GOOD STEADY PACE.    Balance:   Static Sit: FAIR-: Maintains without assist but inconsistent   Dynamic Sit: FAIR: Cannot move trunk without losing balance  Static Stand: POOR+: Needs MINIMAL assist to maintain  Dynamic stand: FAIR: Needs CONTACT GUARD during gait     Therapeutic Activities and Exercises:  STARR LE EXERCISES, OOB T/FS, GT INTO HALLWAY.    AM-PAC 6 CLICK MOBILITY  How much help from another person does this patient currently need?   1 = Unable, Total/Dependent Assistance  2 = A lot, Maximum/Moderate Assistance  3 = A little, Minimum/Contact Guard/Supervision  4 = None, Modified [place]/Independent    Turning over in bed (including adjusting bedclothes, sheets and blankets)?: 4  Sitting down on and standing up from a chair with arms (e.g., wheelchair, bedside commode, etc.): 3  Moving from lying on back to sitting on the side of the bed?: 3  Moving to and from a bed to a chair (including a  wheelchair)?: 3  Need to walk in hospital room?: 3  Climbing 3-5 steps with a railing?: 1  Basic Mobility Total Score: 17    AM-PAC Raw Score CMS G-Code Modifier Level of Impairment Assistance   6 % Total / Unable   7 - 9 CM 80 - 100% Maximal Assist   10 - 14 CL 60 - 80% Moderate Assist   15 - 19 CK 40 - 60% Moderate Assist   20 - 22 CJ 20 - 40% Minimal Assist   23 CI 1-20% SBA / CGA   24 CH 0% Independent/ Mod I     Patient left up in chair with all lines intact and call button in reach.    Assessment:  Khang Patterson is a 78 y.o. male with a medical diagnosis of Near syncope and presents with UNSTEADY DURING T/FS WITH A POSTERIOR LEAN , CORRECTING AT CGA TO MIN ASSISTX1. SHORTSEATED STARR LE EXERCISES WITH VISUAL AND VERBAL CUES FOR FOLLOW THROUGH. AMBULATION IN HALLWAY AT CGAX1 WITH RW , CUES FOR BODY ALIGNMENT WITH AD , RW.    Rehab identified problem list/impairments: Rehab identified problem list/impairments: weakness, gait instability, impaired endurance, impaired balance, impaired self care skills, impaired functional mobilty    Rehab potential is excellent.    Activity tolerance: Excellent    Discharge recommendations: Discharge Facility/Level of Care Needs: home health PT, outpatient OT     Barriers to discharge:      Equipment recommendations:       GOALS:   Multidisciplinary Problems     Physical Therapy Goals        Problem: Physical Therapy Goal    Goal Priority Disciplines Outcome Goal Variances Interventions   Physical Therapy Goal     PT, PT/OT Ongoing (interventions implemented as appropriate)     Description:  LTGS to be met by 3/28/19  1. Pt will perform bed mobility with Supervision  2. Pt will perform sit<>stand functional t/fs with Supervision  3. Pt will ambulate ~150ft with/ without AD with Supervision  4. Pt will demo Good dynamic balance  5. Pt will tolerate 1 x 20 reps (B) LE ROM exercises                    PLAN:    Patient to be seen 5 x/week  to address the above listed problems  via gait training, therapeutic activities, therapeutic exercises  Plan of Care expires: 03/28/19  Plan of Care reviewed with: patient         Jackie Mena, PTA  03/22/2019

## 2019-03-22 NOTE — ASSESSMENT & PLAN NOTE
- Supine BP elevated, but patient with + orthostatics  - Cards resumed home Atenolol at 1/2 home dose today  - Also started on Candasartan  - Verapamil and HCTZ on hold per cards recs  - Hydralazine PRN SBP >180  - Monitor

## 2019-03-22 NOTE — PLAN OF CARE
03/22/19 0950   Medicare Message   Important Message from Medicare regarding Discharge Appeal Rights Given to patient/caregiver;Signed/date by patient/caregiver;Explained to patient/caregiver   Date IMM was signed 03/22/19   Time IMM was signed 0916

## 2019-03-22 NOTE — ASSESSMENT & PLAN NOTE
-Likely elevated secondary to demand ischemia from BP fluctuation/syncopal episode  -Trending down  -Re-start Atenolol 25 mg daily  -Add Candesartan 4 mg daily  -No ASA given orbital hematoma  -No chest pain or SOB  -EF normal on echo  -Stress test as OP

## 2019-03-22 NOTE — ASSESSMENT & PLAN NOTE
- Will need outpatient f/u with Cards in 1 week for Zio patch, referral to EP, and stress test  - continue tele monitoring

## 2019-03-22 NOTE — CONSULTS
Consulted on this 77 y/o M patient for wound care to face, forehead, nasal lacerations sustained during fall on concrete 2 days ago.  Assessment completed, wounds currently AMINATA.  Forehead lac received 2 sutures on 3/20 in Er, and surgical glue was applied to nasal, face, and forehead lacs.  All are dry.  Cleansed all with saline.  Due to surgical glue, and location of wounds, no dressings recommended. Recommend cleanse daily with saline and keep dry, AMINATA. Will follow.

## 2019-03-22 NOTE — SUBJECTIVE & OBJECTIVE
Interval History:  No acute events overnight.  Currently sitting in bedside chair with no complaints.  ECHO showed normal EF.  Second Troponin elevated to 0.38, third decreased to 0.193.  Patient is asymptomatic, denying CP and/or equivalent.  Currently denies dizziness.  Cardiology following and recommended resuming home Atenolol at 1/2 home dose.  Also recommended to stop HCTZ (which may be contributing to his hyponatremia), and started on Candasartan today.  Home Verapamil remains on hold.  Per cards, patient will need to f/u with them in clinic in 1 week to arrange Zio holter, and will need an EP consult.  He would also benefit from a stress test once facial wounds have healed.  Na 127.  Plan to continue IVFs overnight and anticipate DC home in AM if Na improves.  PT/OT has evaluated and recommended HH upon DC, which has been ordered with CM to arrange.      Review of Systems   Constitutional: Negative for chills, diaphoresis, fatigue and fever.   HENT: Positive for facial swelling. Negative for hearing loss, mouth sores, sore throat, tinnitus and trouble swallowing.    Eyes: Positive for discharge. Negative for photophobia, pain, redness and visual disturbance.        Denies vision changes; small amount of creamy white discharge from right eye (chronic due to ingrown eyelash).   Respiratory: Negative for apnea, cough, choking, chest tightness, shortness of breath, wheezing and stridor.    Cardiovascular: Negative for chest pain, palpitations and leg swelling.   Gastrointestinal: Negative for abdominal distention, abdominal pain, blood in stool, constipation, diarrhea, nausea, rectal pain and vomiting.   Endocrine: Negative for cold intolerance, heat intolerance, polydipsia, polyphagia and polyuria.   Genitourinary: Negative for difficulty urinating, dysuria, flank pain, frequency, hematuria and urgency.   Musculoskeletal: Negative for arthralgias, back pain, gait problem, joint swelling, neck pain and neck  stiffness.   Skin: Positive for color change and wound. Negative for rash.   Allergic/Immunologic: Negative for food allergies.   Neurological: Negative for dizziness, tremors, seizures, syncope, speech difficulty, light-headedness and headaches.   Hematological: Negative for adenopathy. Does not bruise/bleed easily.   Psychiatric/Behavioral: Negative for agitation and confusion. The patient is not nervous/anxious.    All other systems reviewed and are negative.    Objective:     Vital Signs (Most Recent):  Temp: 99.1 °F (37.3 °C) (03/22/19 0759)  Pulse: 67 (03/22/19 0905)  Resp: 18 (03/22/19 0905)  BP: (!) 164/72 (03/22/19 0759)  SpO2: 96 % (03/22/19 0905) Vital Signs (24h Range):  Temp:  [97.2 °F (36.2 °C)-99.8 °F (37.7 °C)] 99.1 °F (37.3 °C)  Pulse:  [53-67] 67  Resp:  [16-18] 18  SpO2:  [90 %-96 %] 96 %  BP: (123-164)/(58-74) 164/72     Weight: 68.5 kg (151 lb 0.2 oz)  Body mass index is 22.3 kg/m².    Intake/Output Summary (Last 24 hours) at 3/22/2019 1128  Last data filed at 3/22/2019 0900  Gross per 24 hour   Intake 1736 ml   Output --   Net 1736 ml      Physical Exam   Constitutional: He is oriented to person, place, and time. He appears well-developed and well-nourished. No distress.   HENT:   Head: Normocephalic.       Mouth/Throat: Oropharynx is clear and moist.   Eyes: Conjunctivae and EOM are normal. Pupils are equal, round, and reactive to light.       PERRLA, size 3 bilaterally with brisk response   Neck: Normal range of motion. Neck supple. No JVD present. Carotid bruit is not present.   Cardiovascular: Normal rate, regular rhythm, normal heart sounds and intact distal pulses. Exam reveals no gallop and no friction rub.   No murmur heard.  Pulmonary/Chest: Effort normal and breath sounds normal. No stridor. No respiratory distress. He has no wheezes. He has no rales. He exhibits no tenderness.   Abdominal: Soft. Bowel sounds are normal. He exhibits no distension and no mass. There is no tenderness.  There is no rebound and no guarding.   Musculoskeletal: Normal range of motion. He exhibits no edema or tenderness.   Neurological: He is alert and oriented to person, place, and time. He has normal strength. No cranial nerve deficit or sensory deficit. GCS eye subscore is 4. GCS verbal subscore is 5. GCS motor subscore is 6.   Skin: Skin is warm and dry. No rash noted. He is not diaphoretic. No erythema.   Multiple abrasions/lacerations to right side of face, forehead, nose, and right knee with + ecchymosis and tenderness on palpation.   Psychiatric: He has a normal mood and affect. His behavior is normal. Judgment and thought content normal.   Nursing note and vitals reviewed.          Significant Labs:   BMP:   Recent Labs   Lab 03/22/19  0452      *   K 3.1*   CL 96   CO2 24   BUN 12   CREATININE 0.8   CALCIUM 7.9*   MG 1.7     CBC:   Recent Labs   Lab 03/20/19  1407 03/21/19  0500 03/22/19  0452   WBC 8.14 9.32 8.54   HGB 13.8* 12.0* 11.2*   HCT 38.2* 33.8* 31.6*    197 177     Cardiac Markers: No results for input(s): CKMB, MYOGLOBIN, BNP, TROPISTAT in the last 48 hours.  Troponin:   Recent Labs   Lab 03/20/19  1407 03/21/19  1403 03/22/19  0452   TROPONINI 0.021 0.387* 0.193*       Significant Imaging: I have reviewed all pertinent imaging results/findings within the past 24 hours.

## 2019-03-22 NOTE — PROGRESS NOTES
[facility] -   Cardiology  Progress Note    Patient Name: Khang Patterson  MRN: [id number]  Admission Date: 3/20/2019  Hospital Length of Stay: 1 days  Code Status: Full Code   Attending Physician: Omsani Montaño MD   Primary Care Physician: Leonides Muñoz MD  Expected Discharge Date:   Principal Problem:Near syncope    Subjective:   HPI:  Pt had near syncope at [employer] yesterday.  He felt dizzy walking inside store, fell down.  He then got up and near his car, he again felt dizzy and fell down on face.  No clear syncope.   Denies chest pain sxs or unusual dyspnea.  Has had chronic intermittent dizziness, imbalance.  Denies h/o cad, chf, mi/cva.  ecg on admit NSR, LAFB 1 av block, anterior T wave inversions. No old ecg to compare.  Echo today shows normal LV function.  Troponin only checked once on admit and was negative.  His potassium and sodium were significantly low on admit and now improved with repletion, IV fluids.  His HTN meds have been held.    CTA chest for abnl d-dimer showed no PE.  Carotid u/s no significant disease.  He has been on high dose Verapamil 240 mg bid and Atenolol and HCTZ.    Hospital Course:   3/22/19-Patient seen and examined today, ambulating with therapy. Still fatigued but feeling stronger. No chest pain or SOB. BP higher side. Pulse stable overnight. Labs reviewed, Na 127, K 3.1. Troponin trending down. Echo showed normal EF.        Review of Systems   Constitution: Positive for malaise/fatigue.   HENT: Negative.    Eyes: Negative.    Cardiovascular: Negative.    Respiratory: Negative.    Endocrine: Negative.    Hematologic/Lymphatic: Negative.    Skin: Negative.    Musculoskeletal: Negative.    Gastrointestinal: Negative.    Genitourinary: Negative.    Neurological: Negative.    Psychiatric/Behavioral: Negative.    Allergic/Immunologic: Negative.      Objective:     Vital Signs (Most Recent):  Temp: 98.1 °F (36.7 °C) (03/22/19 1132)  Pulse: 65 (03/22/19  1132)  Resp: 18 (03/22/19 1132)  BP: 136/65 (03/22/19 1132)  SpO2: 98 % (03/22/19 1132) Vital Signs (24h Range):  Temp:  [97.2 °F (36.2 °C)-99.8 °F (37.7 °C)] 98.1 °F (36.7 °C)  Pulse:  [53-67] 65  Resp:  [16-18] 18  SpO2:  [90 %-98 %] 98 %  BP: (123-164)/(58-74) 136/65     Weight: 68.5 kg (151 lb 0.2 oz)  Body mass index is 22.3 kg/m².     SpO2: 98 %  O2 Device (Oxygen Therapy): room air      Intake/Output Summary (Last 24 hours) at 3/22/2019 1155  Last data filed at 3/22/2019 0900  Gross per 24 hour   Intake 1736 ml   Output --   Net 1736 ml       Lines/Drains/Airways          None          Physical Exam   Constitutional: He is oriented to person, place, and time. He appears well-developed and well-nourished. No distress.   HENT:   Head: Normocephalic and atraumatic.   Eyes: Pupils are equal, round, and reactive to light. Right eye exhibits no discharge. Left eye exhibits no discharge.   Neck: Neck supple. No JVD present.   Cardiovascular: Normal rate, regular rhythm, S1 normal, S2 normal and normal heart sounds.   No murmur heard.  Pulmonary/Chest: Effort normal and breath sounds normal. No respiratory distress. He has no wheezes. He has no rales.   Abdominal: Soft. He exhibits no distension.   Musculoskeletal: He exhibits no edema.   Neurological: He is alert and oriented to person, place, and time.   Skin: Skin is warm and dry. He is not diaphoretic. No erythema.   Large right sided orbital hematoma   Psychiatric: He has a normal mood and affect. His behavior is normal. Thought content normal.   Nursing note and vitals reviewed.      Significant Labs:   CMP   Recent Labs   Lab 03/20/19  1407 03/21/19  0500 03/21/19  1147 03/22/19  0452   * 128* 130* 127*   K 2.9* 3.3* 3.8 3.1*   CL 90* 97 97 96   CO2 26 23 27 24   * 110 107 101   BUN 32* 19 16 12   CREATININE 1.2 0.8 0.9 0.8   CALCIUM 9.0 8.1* 8.2* 7.9*   PROT 7.0  --   --   --    ALBUMIN 3.7  --   --   --    BILITOT 1.2*  --   --   --    ALKPHOS  45*  --   --   --    AST 22  --   --   --    ALT 16  --   --   --    ANIONGAP 12 8 6* 7*   ESTGFRAFRICA >60 >60 >60 >60   EGFRNONAA 57* >60 >60 >60   , CBC   Recent Labs   Lab 03/20/19  1407 03/21/19  0500 03/22/19  0452   WBC 8.14 9.32 8.54   HGB 13.8* 12.0* 11.2*   HCT 38.2* 33.8* 31.6*    197 177   , Troponin   Recent Labs   Lab 03/20/19  1407 03/21/19  1403 03/22/19  0452   TROPONINI 0.021 0.387* 0.193*    and All pertinent lab results from the last 24 hours have been reviewed.    Significant Imaging: Echocardiogram:   2D echo with color flow doppler:   Results for orders placed or performed during the hospital encounter of 03/20/19   2D echo with color flow doppler   Result Value Ref Range    QEF 60 55 - 65    Mitral Valve Regurgitation MILD     Aortic Valve Regurgitation MILD     Tricuspid Valve Regurgitation MILD    , EKG: REviewed and X-Ray: CXR: X-Ray Chest 1 View (CXR): No results found for this visit on 03/20/19. and X-Ray Chest PA and Lateral (CXR):   Results for orders placed or performed during the hospital encounter of 03/20/19   X-Ray Chest PA And Lateral    Narrative    EXAMINATION:  XR CHEST PA AND LATERAL    CLINICAL HISTORY:  Dizziness and giddiness    COMPARISON:  None    FINDINGS:  The size and contour of the heart are normal.  There is no evidence of an acute pulmonary process.  There is a 13 mm calcific density projected over the anterolateral aspect of the left 5th rib.  There is no pneumothorax or pleural effusion.  There are mild degenerative changes in the thoracic spine.  There is a mild amount of dextroconvex curvature of the thoracic spine.      Impression    1. There is no evidence of an acute pulmonary process.  2. There is a 13 mm calcific density projected over the anterolateral aspect of the left 5th rib.  3. There is a mild amount of dextroconvex curvature of the thoracic spine. There are mild degenerative changes in the thoracic spine.  .      Electronically signed  by: Omar Trejo MD  Date:    03/20/2019  Time:    13:40     Assessment and Plan:   Patient who presents with near syncope/orthostatic hypotension/bradycardia. Improved s/p admission. Meds adjusted. Elevated troponin secondary to demand ischemia. Follow-up in clinic for OP stress test.    * Near syncope    See management plan detailed above.      Elevated troponin    -Likely elevated secondary to demand ischemia from BP fluctuation/ near syncopal episode  -Trending down  -Re-start Atenolol 25 mg daily  -Add Candesartan 4 mg daily  -No ASA given orbital hematoma  -No chest pain or SOB  -EF normal on echo  -Stress test as OP     Abnormal ECG    -Plan on OP stress test  -Resume atenolol 25 mg daily  -Candesartan added today     Benign essential HTN    -Medications further adjusted today  -Stay off Verapamil  -Start Atenolol 25 mg daily  -Start Candesartan 4 mg daily  -Monitor, may need further titration as OP     Orthostatic hypotension    -BP stabilized  -Meds adjusted, monitor      Hypokalemia    -Mgmt as per hospital medicine; replete     Hyponatremia    -Mgmt as per hospital medicine  -Avoid HCTZ     Hematoma    -Mgmt as per hospital medicine     Abrasion of face    See management plan detailed above.          VTE Risk Mitigation (From admission, onward)        Ordered     Place MAX hose  Until discontinued      03/20/19 1853     IP VTE HIGH RISK PATIENT  Once      03/20/19 1828     Place sequential compression device  Until discontinued      03/20/19 1828     Place MAX hose  Until discontinued      03/20/19 1808          Anel Reeves PA-C  Cardiology  [facility] -     Chart reviewed. Dr. Santillan examined patient and agrees with plan as outlined above.

## 2019-03-22 NOTE — SUBJECTIVE & OBJECTIVE
Review of Systems   Constitution: Positive for malaise/fatigue.   HENT: Negative.    Eyes: Negative.    Cardiovascular: Negative.    Respiratory: Negative.    Endocrine: Negative.    Hematologic/Lymphatic: Negative.    Skin: Negative.    Musculoskeletal: Negative.    Gastrointestinal: Negative.    Genitourinary: Negative.    Neurological: Negative.    Psychiatric/Behavioral: Negative.    Allergic/Immunologic: Negative.      Objective:     Vital Signs (Most Recent):  Temp: 98.1 °F (36.7 °C) (03/22/19 1132)  Pulse: 65 (03/22/19 1132)  Resp: 18 (03/22/19 1132)  BP: 136/65 (03/22/19 1132)  SpO2: 98 % (03/22/19 1132) Vital Signs (24h Range):  Temp:  [97.2 °F (36.2 °C)-99.8 °F (37.7 °C)] 98.1 °F (36.7 °C)  Pulse:  [53-67] 65  Resp:  [16-18] 18  SpO2:  [90 %-98 %] 98 %  BP: (123-164)/(58-74) 136/65     Weight: 68.5 kg (151 lb 0.2 oz)  Body mass index is 22.3 kg/m².     SpO2: 98 %  O2 Device (Oxygen Therapy): room air      Intake/Output Summary (Last 24 hours) at 3/22/2019 1155  Last data filed at 3/22/2019 0900  Gross per 24 hour   Intake 1736 ml   Output --   Net 1736 ml       Lines/Drains/Airways          None          Physical Exam   Constitutional: He is oriented to person, place, and time. He appears well-developed and well-nourished. No distress.   HENT:   Head: Normocephalic and atraumatic.   Eyes: Pupils are equal, round, and reactive to light. Right eye exhibits no discharge. Left eye exhibits no discharge.   Neck: Neck supple. No JVD present.   Cardiovascular: Normal rate, regular rhythm, S1 normal, S2 normal and normal heart sounds.   No murmur heard.  Pulmonary/Chest: Effort normal and breath sounds normal. No respiratory distress. He has no wheezes. He has no rales.   Abdominal: Soft. He exhibits no distension.   Musculoskeletal: He exhibits no edema.   Neurological: He is alert and oriented to person, place, and time.   Skin: Skin is warm and dry. He is not diaphoretic. No erythema.   Large right sided  orbital hematoma   Psychiatric: He has a normal mood and affect. His behavior is normal. Thought content normal.   Nursing note and vitals reviewed.      Significant Labs:   CMP   Recent Labs   Lab 03/20/19  1407 03/21/19  0500 03/21/19  1147 03/22/19  0452   * 128* 130* 127*   K 2.9* 3.3* 3.8 3.1*   CL 90* 97 97 96   CO2 26 23 27 24   * 110 107 101   BUN 32* 19 16 12   CREATININE 1.2 0.8 0.9 0.8   CALCIUM 9.0 8.1* 8.2* 7.9*   PROT 7.0  --   --   --    ALBUMIN 3.7  --   --   --    BILITOT 1.2*  --   --   --    ALKPHOS 45*  --   --   --    AST 22  --   --   --    ALT 16  --   --   --    ANIONGAP 12 8 6* 7*   ESTGFRAFRICA >60 >60 >60 >60   EGFRNONAA 57* >60 >60 >60   , CBC   Recent Labs   Lab 03/20/19  1407 03/21/19  0500 03/22/19  0452   WBC 8.14 9.32 8.54   HGB 13.8* 12.0* 11.2*   HCT 38.2* 33.8* 31.6*    197 177   , Troponin   Recent Labs   Lab 03/20/19  1407 03/21/19  1403 03/22/19  0452   TROPONINI 0.021 0.387* 0.193*    and All pertinent lab results from the last 24 hours have been reviewed.    Significant Imaging: Echocardiogram:   2D echo with color flow doppler:   Results for orders placed or performed during the hospital encounter of 03/20/19   2D echo with color flow doppler   Result Value Ref Range    QEF 60 55 - 65    Mitral Valve Regurgitation MILD     Aortic Valve Regurgitation MILD     Tricuspid Valve Regurgitation MILD    , EKG: REviewed and X-Ray: CXR: X-Ray Chest 1 View (CXR): No results found for this visit on 03/20/19. and X-Ray Chest PA and Lateral (CXR):   Results for orders placed or performed during the hospital encounter of 03/20/19   X-Ray Chest PA And Lateral    Narrative    EXAMINATION:  XR CHEST PA AND LATERAL    CLINICAL HISTORY:  Dizziness and giddiness    COMPARISON:  None    FINDINGS:  The size and contour of the heart are normal.  There is no evidence of an acute pulmonary process.  There is a 13 mm calcific density projected over the anterolateral aspect of the  left 5th rib.  There is no pneumothorax or pleural effusion.  There are mild degenerative changes in the thoracic spine.  There is a mild amount of dextroconvex curvature of the thoracic spine.      Impression    1. There is no evidence of an acute pulmonary process.  2. There is a 13 mm calcific density projected over the anterolateral aspect of the left 5th rib.  3. There is a mild amount of dextroconvex curvature of the thoracic spine. There are mild degenerative changes in the thoracic spine.  .      Electronically signed by: Omar Trejo MD  Date:    03/20/2019  Time:    13:40

## 2019-03-22 NOTE — ASSESSMENT & PLAN NOTE
-Medications further adjusted today  -Stay off Verapamil  -Start Atenolol 25 mg daily  -Start Candesartan 4 mg daily  -Monitor, may need further titration as OP

## 2019-03-22 NOTE — PROGRESS NOTES
"[facility] Hospital Medicine  Progress Note    Patient Name: Khang Patterson  MRN: [id number]  Patient Class: IP- Inpatient   Admission Date: 3/20/2019  Length of Stay: 1 days  Attending Physician: Osmani Montaño MD  Primary Care Provider: Leonides Muñoz MD        Subjective:     Principal Problem:Near syncope    HPI:  Khang Patterson is a 77 y/o male with a PMHx of HTN and RA, who presented to the ER today for evaluation secondary to Near Syncope and a Fall.  Patient was reportedly walking in the [email or web address] today when he became dizzy and fell, striking his head and right knee on the concrete.  Patient reports episodes of dizziness "on and off for about 3 weeks now where I have to hold onto things to keep from falling".  He is noted to have a lacerations to his nose and forehead and a hematoma above his right eye.  He denies CP, SOB, H/A, visual disturbances, N/V/D, abdominal pain, dysuria, and all other symptoms at this time.  Patient does report periods of hypoglycemia, but is uncertain if his blood glucose was low at the time of his fall.  No aggrevating or alleviating factors reported.  No prior treatment.  ER workup showed:  Stable VS, but positive orthostatics.  12 lead EKG showed Sinus rhythm with sinus arrhythmia with 1st degree A-V block.  CBC unremarkable.  D dimer elevated to 3.91.  CTA chest negative for PE.  BMP revealed Na of 128, KCL 2.9, Cl 90, .  Initial troponin negative.  CT head showed a small hematoma overlying the right side of the frontal bone.  No calvarial fracture or intracranial hemorrhage.  There is mild generalized cerebral and cerebellar atrophy.  There are chronic appearing ischemic changes in the deep white matter of both cerebral hemispheres.  There is no evidence of an acute ischemic event.  ECHO and Carotid US are pending.  Hospital Medicine called for admission.  He will be placed in OBS.  He is a Full Code.  His SDM is his son Adis who can " "be reached at [phone].    Hospital Course:  On 3/20 patient was placed in OBS secondary to Near Syncope, Fall, and Orthostatic Hypotension.  Patient reports a 3 week h/o dizziness "so bad I have to stop and hold onto things".  Patient reports he has never "passed out, or fallen before".  CT head showed a small hematoma overlying the right side of the frontal bone.  There is no calvarial fracture or intracranial hemorrhage.  There is mild generalized cerebral and cerebellar atrophy and chronic appearing ischemic changes in the deep white matter of both cerebral hemispheres.  There is no evidence of an acute ischemic event.  D dimer noted to be 3.91, but patient has a h/o RA.  CTA chest negative for PE.  12 lead EKG showed SR with 1st degree AVB.  Troponin negative.  KCl 2.9 for which he was given a total of 80 meq.  Na 128.  Patient's home Verapamil was held and he was started on IVFs.    As of 3/21 Carotid US negative.  ECHO pending.  Na improved to 130, KCL improved to 3.8.  Orthostatic hypotension persists, but dizziness is improving.  Continue IVFs.  Cardiology consult pending.  Will transition to inpatient for continued care.    As of 3/22 ECHO showed normal EF.  Second Troponin elevated to 0.38, third decreased to 0.193.  Patient is asymptomatic, denying CP and/or equivalent.  Currently denies dizziness.  Cardiology following and recommended resuming home Atenolol at 1/2 home dose.  Also recommended to stop HCTZ (which may be contributing to his hyponatremia), and started on Candasartan today.  Home Verapamil remains on hold.  Per cards, patient will need to f/u with them in clinic in 1 week to arrange Zio holter, and will need an EP consult.  He would also benefit from a stress test once facial wounds have healed.  Na 127.  Plan to continue IVFs overnight and anticipate DC home in AM if Na improves.  PT/OT has evaluated and recommended HH upon DC, which has been ordered with CM to arrange.      Interval " History:  No acute events overnight.  Currently sitting in bedside chair with no complaints.  ECHO showed normal EF.  Second Troponin elevated to 0.38, third decreased to 0.193.  Patient is asymptomatic, denying CP and/or equivalent.  Currently denies dizziness.  Cardiology following and recommended resuming home Atenolol at 1/2 home dose.  Also recommended to stop HCTZ (which may be contributing to his hyponatremia), and started on Candasartan today.  Home Verapamil remains on hold.  Per cards, patient will need to f/u with them in clinic in 1 week to arrange Zio holter, and will need an EP consult.  He would also benefit from a stress test once facial wounds have healed.  Na 127.  Plan to continue IVFs overnight and anticipate DC home in AM if Na improves.  PT/OT has evaluated and recommended HH upon DC, which has been ordered with CM to arrange.      Review of Systems   Constitutional: Negative for chills, diaphoresis, fatigue and fever.   HENT: Positive for facial swelling. Negative for hearing loss, mouth sores, sore throat, tinnitus and trouble swallowing.    Eyes: Positive for discharge. Negative for photophobia, pain, redness and visual disturbance.        Denies vision changes; small amount of creamy white discharge from right eye (chronic due to ingrown eyelash).   Respiratory: Negative for apnea, cough, choking, chest tightness, shortness of breath, wheezing and stridor.    Cardiovascular: Negative for chest pain, palpitations and leg swelling.   Gastrointestinal: Negative for abdominal distention, abdominal pain, blood in stool, constipation, diarrhea, nausea, rectal pain and vomiting.   Endocrine: Negative for cold intolerance, heat intolerance, polydipsia, polyphagia and polyuria.   Genitourinary: Negative for difficulty urinating, dysuria, flank pain, frequency, hematuria and urgency.   Musculoskeletal: Negative for arthralgias, back pain, gait problem, joint swelling, neck pain and neck stiffness.    Skin: Positive for color change and wound. Negative for rash.   Allergic/Immunologic: Negative for food allergies.   Neurological: Negative for dizziness, tremors, seizures, syncope, speech difficulty, light-headedness and headaches.   Hematological: Negative for adenopathy. Does not bruise/bleed easily.   Psychiatric/Behavioral: Negative for agitation and confusion. The patient is not nervous/anxious.    All other systems reviewed and are negative.    Objective:     Vital Signs (Most Recent):  Temp: 99.1 °F (37.3 °C) (03/22/19 0759)  Pulse: 67 (03/22/19 0905)  Resp: 18 (03/22/19 0905)  BP: (!) 164/72 (03/22/19 0759)  SpO2: 96 % (03/22/19 0905) Vital Signs (24h Range):  Temp:  [97.2 °F (36.2 °C)-99.8 °F (37.7 °C)] 99.1 °F (37.3 °C)  Pulse:  [53-67] 67  Resp:  [16-18] 18  SpO2:  [90 %-96 %] 96 %  BP: (123-164)/(58-74) 164/72     Weight: 68.5 kg (151 lb 0.2 oz)  Body mass index is 22.3 kg/m².    Intake/Output Summary (Last 24 hours) at 3/22/2019 1128  Last data filed at 3/22/2019 0900  Gross per 24 hour   Intake 1736 ml   Output --   Net 1736 ml      Physical Exam   Constitutional: He is oriented to person, place, and time. He appears well-developed and well-nourished. No distress.   HENT:   Head: Normocephalic.       Mouth/Throat: Oropharynx is clear and moist.   Eyes: Conjunctivae and EOM are normal. Pupils are equal, round, and reactive to light.       PERRLA, size 3 bilaterally with brisk response   Neck: Normal range of motion. Neck supple. No JVD present. Carotid bruit is not present.   Cardiovascular: Normal rate, regular rhythm, normal heart sounds and intact distal pulses. Exam reveals no gallop and no friction rub.   No murmur heard.  Pulmonary/Chest: Effort normal and breath sounds normal. No stridor. No respiratory distress. He has no wheezes. He has no rales. He exhibits no tenderness.   Abdominal: Soft. Bowel sounds are normal. He exhibits no distension and no mass. There is no tenderness. There is no  rebound and no guarding.   Musculoskeletal: Normal range of motion. He exhibits no edema or tenderness.   Neurological: He is alert and oriented to person, place, and time. He has normal strength. No cranial nerve deficit or sensory deficit. GCS eye subscore is 4. GCS verbal subscore is 5. GCS motor subscore is 6.   Skin: Skin is warm and dry. No rash noted. He is not diaphoretic. No erythema.   Multiple abrasions/lacerations to right side of face, forehead, nose, and right knee with + ecchymosis and tenderness on palpation.   Psychiatric: He has a normal mood and affect. His behavior is normal. Judgment and thought content normal.   Nursing note and vitals reviewed.          Significant Labs:   BMP:   Recent Labs   Lab 03/22/19  0452      *   K 3.1*   CL 96   CO2 24   BUN 12   CREATININE 0.8   CALCIUM 7.9*   MG 1.7     CBC:   Recent Labs   Lab 03/20/19  1407 03/21/19  0500 03/22/19  0452   WBC 8.14 9.32 8.54   HGB 13.8* 12.0* 11.2*   HCT 38.2* 33.8* 31.6*    197 177     Cardiac Markers: No results for input(s): CKMB, MYOGLOBIN, BNP, TROPISTAT in the last 48 hours.  Troponin:   Recent Labs   Lab 03/20/19  1407 03/21/19  1403 03/22/19  0452   TROPONINI 0.021 0.387* 0.193*       Significant Imaging: I have reviewed all pertinent imaging results/findings within the past 24 hours.    Assessment/Plan:      * Near syncope    - Transition to inpatient for continued care  - CT head showed a small hematoma overlying the right side of the frontal bone.  No calvarial fracture or intracranial hemorrhage.  There is mild generalized cerebral and cerebellar atrophy.  There are chronic appearing ischemic changes in the deep white matter of both cerebral hemispheres.  There is no evidence of an acute ischemic event.   - Positive Orthostatics  - Cardiology following and recommends outpatient Zio monitor, EP evaluation, and stress test.   - Hold home Verapamil and HCTZ   - Continuous IVFs  - Place MAX hose  -  Carotid US negative  - ECHO normal  - Monitor orthostatics  - Neuro checks q 4 hours  - PT/OT evaluated and recommended home with HH; CM following to arrange  - Tele monitoring  - Anticipate DC home in the AM       Orthostatic hypotension    - Improving with IVFs and adjustment of medications  - See plan as per above       Hematoma    - Secondary to fall PTA  - CT head showed a small hematoma overlying the right side of the frontal bone.  No calvarial fracture or intracranial hemorrhage.    - Continue local wound care  - No anticoagulation  - Monitor     Abrasion of face    - Continue local wound care  - Received tetanus vaccine in the ER       Hyponatremia    - Na improved 127  - Currently asymptomatic  - Continue NS at 125 ml/hour  - Daily BMP  - Anticipate DC home in AM if Na improves overnight  - May need nephrology consult pending clinical course  - Monitor       Elevated troponin    - Initial troponin negative, repeat 0.387, 0.193  - Denies CP and/or equivalent   - No AC given hematoma  - Cardiology following; will need outpatient f/u for Zio patch, referral to EP, and stress test once facial wounds have healed  - tele monitoring         Benign essential HTN    - Supine BP elevated, but patient with + orthostatics  - Cards resumed home Atenolol at 1/2 home dose today  - Also started on Candasartan  - Verapamil and HCTZ on hold per cards recs  - Hydralazine PRN SBP >180  - Monitor        Rheumatoid arthritis    - Hold home Methotrexate and Plaquenil for now and resume at DC       Fall    - Secondary to #1  - PT/OT consulted and recommended home with HH; CM following to arrange        Abnormal ECG    - Will need outpatient f/u with Cards in 1 week for Zio patch, referral to EP, and stress test  - continue tele monitoring       Hypokalemia    - KCL 3.1  - replete with 40 meq today  - Mag 1.7  - Daily BMP  - Monitor and replete as needed         VTE Risk Mitigation (From admission, onward)        Ordered     Place  MAX hose  Until discontinued      03/20/19 1853     IP VTE HIGH RISK PATIENT  Once      03/20/19 1828     Place sequential compression device  Until discontinued      03/20/19 1828     Place MAX hose  Until discontinued     Ambulatory; no AC given hematoma 03/20/19 1808              Jazmine Roach, MARJORIE, ACNP-BC  Department of Hospital Medicine   [facility]

## 2019-03-22 NOTE — PT/OT/SLP PROGRESS
Occupational Therapy      Patient Name:  Khang Patterson   MRN:  [id number]    eval initiate via chart review. Will complete eval  At later time.    Jasmine Ponce, OT  3/22/2019

## 2019-03-23 VITALS
DIASTOLIC BLOOD PRESSURE: 75 MMHG | TEMPERATURE: 99 F | BODY MASS INDEX: 22.36 KG/M2 | WEIGHT: 151 LBS | HEIGHT: 69 IN | HEART RATE: 66 BPM | SYSTOLIC BLOOD PRESSURE: 166 MMHG | RESPIRATION RATE: 18 BRPM | OXYGEN SATURATION: 94 %

## 2019-03-23 PROBLEM — R79.89 ELEVATED TROPONIN: Status: RESOLVED | Noted: 2019-03-22 | Resolved: 2019-03-23

## 2019-03-23 PROBLEM — R55 NEAR SYNCOPE: Status: RESOLVED | Noted: 2019-03-20 | Resolved: 2019-03-23

## 2019-03-23 PROBLEM — R94.31 ABNORMAL ECG: Status: RESOLVED | Noted: 2019-03-21 | Resolved: 2019-03-23
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
## 2019-03-23 PROBLEM — I95.1 ORTHOSTATIC HYPOTENSION: Status: RESOLVED | Noted: 2019-03-20 | Resolved: 2019-03-23

## 2019-03-23 LAB
ANION GAP SERPL CALC-SCNC: 12 MMOL/L
BASOPHILS # BLD AUTO: 0.02 K/UL
BASOPHILS NFR BLD: 0.2 %
BUN SERPL-MCNC: 10 MG/DL
CALCIUM SERPL-MCNC: 8.4 MG/DL
CHLORIDE SERPL-SCNC: 93 MMOL/L
CO2 SERPL-SCNC: 21 MMOL/L
CREAT SERPL-MCNC: 0.7 MG/DL
DIFFERENTIAL METHOD: ABNORMAL
EOSINOPHIL # BLD AUTO: 0.2 K/UL
EOSINOPHIL NFR BLD: 2.1 %
ERYTHROCYTE [DISTWIDTH] IN BLOOD BY AUTOMATED COUNT: 12.6 %
EST. GFR  (AFRICAN AMERICAN): >60 ML/MIN/1.73 M^2
EST. GFR  (NON AFRICAN AMERICAN): >60 ML/MIN/1.73 M^2
GLUCOSE SERPL-MCNC: 94 MG/DL
HCT VFR BLD AUTO: 34.8 %
HGB BLD-MCNC: 12.6 G/DL
LYMPHOCYTES # BLD AUTO: 0.9 K/UL
LYMPHOCYTES NFR BLD: 8.7 %
MAGNESIUM SERPL-MCNC: 1.6 MG/DL
MCH RBC QN AUTO: 32.6 PG
MCHC RBC AUTO-ENTMCNC: 36.2 G/DL
MCV RBC AUTO: 90 FL
MONOCYTES # BLD AUTO: 1.1 K/UL
MONOCYTES NFR BLD: 11.3 %
NEUTROPHILS # BLD AUTO: 7.6 K/UL
NEUTROPHILS NFR BLD: 77.7 %
PLATELET # BLD AUTO: 203 K/UL
PMV BLD AUTO: 10.2 FL
POTASSIUM SERPL-SCNC: 3.2 MMOL/L
RBC # BLD AUTO: 3.86 M/UL
SODIUM SERPL-SCNC: 126 MMOL/L
WBC # BLD AUTO: 9.84 K/UL

## 2019-03-23 PROCEDURE — 80048 BASIC METABOLIC PNL TOTAL CA: CPT

## 2019-03-23 PROCEDURE — 94761 N-INVAS EAR/PLS OXIMETRY MLT: CPT

## 2019-03-23 PROCEDURE — 83735 ASSAY OF MAGNESIUM: CPT

## 2019-03-23 PROCEDURE — 25000003 PHARM REV CODE 250: Performed by: REGISTERED NURSE

## 2019-03-23 PROCEDURE — 36415 COLL VENOUS BLD VENIPUNCTURE: CPT

## 2019-03-23 PROCEDURE — 85025 COMPLETE CBC W/AUTO DIFF WBC: CPT

## 2019-03-23 PROCEDURE — 25000003 PHARM REV CODE 250: Performed by: INTERNAL MEDICINE

## 2019-03-23 PROCEDURE — 97116 GAIT TRAINING THERAPY: CPT

## 2019-03-23 RX ORDER — CLONIDINE HYDROCHLORIDE 0.1 MG/1
0.1 TABLET ORAL 2 TIMES DAILY
Status: DISCONTINUED | OUTPATIENT
Start: 2019-03-23 | End: 2019-03-23 | Stop reason: HOSPADM

## 2019-03-23 RX ORDER — POTASSIUM CHLORIDE 20 MEQ/1
40 TABLET, EXTENDED RELEASE ORAL ONCE
Status: COMPLETED | OUTPATIENT
Start: 2019-03-23 | End: 2019-03-23

## 2019-03-23 RX ORDER — ATENOLOL 25 MG/1
25 TABLET ORAL DAILY
Qty: 30 TABLET | Refills: 11 | Status: SHIPPED | OUTPATIENT
Start: 2019-03-24 | End: 2020-03-23

## 2019-03-23 RX ORDER — CLONIDINE HYDROCHLORIDE 0.1 MG/1
0.1 TABLET ORAL 2 TIMES DAILY
Qty: 60 TABLET | Refills: 11 | Status: SHIPPED | OUTPATIENT
Start: 2019-03-23 | End: 2020-03-22

## 2019-03-23 RX ORDER — CANDESARTAN 4 MG/1
4 TABLET ORAL DAILY
Qty: 90 TABLET | Refills: 3 | Status: SHIPPED | OUTPATIENT
Start: 2019-03-24 | End: 2020-03-23

## 2019-03-23 RX ADMIN — CLONIDINE HYDROCHLORIDE 0.1 MG: 0.1 TABLET ORAL at 11:03

## 2019-03-23 RX ADMIN — CANDESARTAN CILEXETIL 4 MG: 4 TABLET ORAL at 08:03

## 2019-03-23 RX ADMIN — POTASSIUM CHLORIDE 40 MEQ: 1500 TABLET, EXTENDED RELEASE ORAL at 11:03

## 2019-03-23 RX ADMIN — SODIUM CHLORIDE: 0.9 INJECTION, SOLUTION INTRAVENOUS at 11:03

## 2019-03-23 RX ADMIN — SODIUM CHLORIDE: 0.9 INJECTION, SOLUTION INTRAVENOUS at 02:03

## 2019-03-23 RX ADMIN — HYPROMELLOSE 2910 1 DROP: 5 SOLUTION OPHTHALMIC at 08:03

## 2019-03-23 RX ADMIN — ATENOLOL 25 MG: 25 TABLET ORAL at 08:03

## 2019-03-23 NOTE — ASSESSMENT & PLAN NOTE
- KCL 3.1  - replete with 40 meq today  - Mag 1.7  - Daily BMP  - Monitor and replete as needed    3.2 today  Replete

## 2019-03-23 NOTE — DISCHARGE SUMMARY
"[facility] Hospital Medicine  Discharge Summary      Patient Name: Khang Patterson  MRN: [id number]  Admission Date: 3/20/2019  Hospital Length of Stay: 2 days  Discharge Date and Time: 3/23/2019  1:44 PM  Attending Physician: No att. providers found   Discharging Provider: Geovanny Brady MD  Primary Care Provider: Leonides Muñoz MD      HPI:   Khang Patterson is a 77 y/o male with a PMHx of HTN and RA, who presented to the ER today for evaluation secondary to Near Syncope and a Fall.  Patient was reportedly walking in the [facility] today when he became dizzy and fell, striking his head and right knee on the concrete.  Patient reports episodes of dizziness "on and off for about 3 weeks now where I have to hold onto things to keep from falling".  He is noted to have a lacerations to his nose and forehead and a hematoma above his right eye.  He denies CP, SOB, H/A, visual disturbances, N/V/D, abdominal pain, dysuria, and all other symptoms at this time.  Patient does report periods of hypoglycemia, but is uncertain if his blood glucose was low at the time of his fall.  No aggrevating or alleviating factors reported.  No prior treatment.  ER workup showed:  Stable VS, but positive orthostatics.  12 lead EKG showed Sinus rhythm with sinus arrhythmia with 1st degree A-V block.  CBC unremarkable.  D dimer elevated to 3.91.  CTA chest negative for PE.  BMP revealed Na of 128, KCL 2.9, Cl 90, .  Initial troponin negative.  CT head showed a small hematoma overlying the right side of the frontal bone.  No calvarial fracture or intracranial hemorrhage.  There is mild generalized cerebral and cerebellar atrophy.  There are chronic appearing ischemic changes in the deep white matter of both cerebral hemispheres.  There is no evidence of an acute ischemic event.  ECHO and Carotid US are pending.  [facility] called for admission.  He will be placed in OBS.  He is a Full Code.  His SDM " "is his son Adis who can be reached at [phone].    * No surgery found *      Hospital Course:   On 3/20 patient was placed in OBS secondary to Near Syncope, Fall, and Orthostatic Hypotension.  Patient reports a 3 week h/o dizziness "so bad I have to stop and hold onto things".  Patient reports he has never "passed out, or fallen before".  CT head showed a small hematoma overlying the right side of the frontal bone.  There is no calvarial fracture or intracranial hemorrhage.  There is mild generalized cerebral and cerebellar atrophy and chronic appearing ischemic changes in the deep white matter of both cerebral hemispheres.  There is no evidence of an acute ischemic event.  D dimer noted to be 3.91, but patient has a h/o RA.  CTA chest negative for PE.  12 lead EKG showed SR with 1st degree AVB.  Troponin negative.  KCl 2.9 for which he was given a total of 80 meq.  Na 128.  Patient's home Verapamil was held and he was started on IVFs.    As of 3/21 Carotid US negative.  ECHO pending.  Na improved to 130, KCL improved to 3.8.  Orthostatic hypotension persists, but dizziness is improving.  Continue IVFs.  Cardiology consult pending.  Will transition to inpatient for continued care.    As of 3/22 ECHO showed normal EF.  Second Troponin elevated to 0.38, third decreased to 0.193.  Patient is asymptomatic, denying CP and/or equivalent.  Currently denies dizziness.  Cardiology following and recommended resuming home Atenolol at 1/2 home dose.  Also recommended to stop HCTZ (which may be contributing to his hyponatremia), and started on Candasartan today.  Home Verapamil remains on hold.  Per cards, patient will need to f/u with them in clinic in 1 week to arrange Zio holter, and will need an EP consult.  He would also benefit from a stress test once facial wounds have healed.  Na 127.  Plan to continue IVFs overnight and anticipate DC home in AM if Na improves.  PT/OT has evaluated and recommended HH upon DC, which " has been ordered with CM to arrange.  Patient seen seen and examined today prior to his discharge to home.     Consults:   Consults (From admission, onward)        Status Ordering Provider     Inpatient consult to Cardiology  Once     Provider:  Dilip Santillan MD    Completed CLEMENCIA FUENTES          Rheumatoid arthritis    - Hold home Methotrexate and Plaquenil for now and resume at DC    Resume home meds       Benign essential HTN    - Supine BP elevated, but patient with + orthostatics  - Cards resumed home Atenolol at 1/2 home dose today  - Also started on Candasartan  - Verapamil and HCTZ on hold per cards recs  - Hydralazine PRN SBP >180  - Monitor        Hypokalemia    - KCL 3.1  - replete with 40 meq today  - Mag 1.7  - Daily BMP  - Monitor and replete as needed    3.2 today  Replete     Hyponatremia    - Na improved 127  - Currently asymptomatic  - Continue NS at 125 ml/hour  - Daily BMP  - Anticipate DC home in AM if Na improves overnight  - May need nephrology consult pending clinical course  - Monitor    Stable  Na 126 today       Hematoma    - Secondary to fall PTA  - CT head showed a small hematoma overlying the right side of the frontal bone.  No calvarial fracture or intracranial hemorrhage.    - Continue local wound care  - No anticoagulation  - Monitor     Abrasion of face    - Continue local wound care  - Received tetanus vaccine in the ER         Final Active Diagnoses:    Diagnosis Date Noted POA    Laceration of forehead [S01.81XA] 03/22/2019 Yes    Abrasion of face [S00.81XA] 03/20/2019 Yes    Hematoma [T14.8XXA] 03/20/2019 Yes    Hyponatremia [E87.1] 03/20/2019 Yes    Hypokalemia [E87.6] 03/20/2019 Yes    Benign essential HTN [I10] 03/20/2019 Yes    Rheumatoid arthritis [M06.9] 03/20/2019 Yes      Problems Resolved During this Admission:    Diagnosis Date Noted Date Resolved POA    PRINCIPAL PROBLEM:  Near syncope [R55] 03/20/2019 03/23/2019 Yes    Elevated troponin [R74.8]  03/22/2019 03/23/2019 Yes    Dizziness [R42] 03/21/2019 03/21/2019 Yes    Fall [W19.XXXA] 03/21/2019 03/23/2019 Yes    Abnormal ECG [R94.31] 03/21/2019 03/23/2019 Yes    Dizziness [R42] 03/20/2019 03/20/2019 Yes    Orthostatic hypotension [I95.1] 03/20/2019 03/23/2019 Yes       Discharged Condition: stable    Disposition: Home-Health Care Svc    Follow Up:  Follow-up Information     [facility]    Specialties:  DME Provider, Home Health Services  Why:  Home Health  Contact information:  [address] [facility] [address] [phone]             Leonides Muñoz MD In 3 days.    Specialty:  Internal Medicine  Contact information:  [address]   [facility] [address] [phone]                 Patient Instructions:      Diet Cardiac     Activity as tolerated       Significant Diagnostic Studies: Labs:   BMP:   Recent Labs   Lab 03/22/19 0452 03/23/19 0423    94   * 126*   K 3.1* 3.2*   CL 96 93*   CO2 24 21*   BUN 12 10   CREATININE 0.8 0.7   CALCIUM 7.9* 8.4*   MG 1.7 1.6   , CMP   Recent Labs   Lab 03/22/19 0452 03/23/19 0423   * 126*   K 3.1* 3.2*   CL 96 93*   CO2 24 21*    94   BUN 12 10   CREATININE 0.8 0.7   CALCIUM 7.9* 8.4*   ANIONGAP 7* 12   ESTGFRAFRICA >60 >60   EGFRNONAA >60 >60   , CBC   Recent Labs   Lab 03/22/19 0452 03/23/19 0423   WBC 8.54 9.84   HGB 11.2* 12.6*   HCT 31.6* 34.8*    203   , Troponin   Recent Labs   Lab 03/22/19 0452   TROPONINI 0.193*    and All labs within the past 24 hours have been reviewed    Pending Diagnostic Studies:     None         Medications:  Reconciled Home Medications:      Medication List      START taking these medications    candesartan 4 MG tablet  Commonly known as:  ATACAND  Take 1 tablet (4 mg total) by mouth once daily.  Start taking on:  3/24/2019     cloNIDine 0.1 MG tablet  Commonly known as:  CATAPRES  Take 1 tablet (0.1 mg total) by mouth 2 (two) times daily.         CHANGE how you take these medications    atenolol 25 MG tablet  Commonly known as:  TENORMIN  Take 1 tablet (25 mg total) by mouth once daily.  Start taking on:  3/24/2019  What changed:    · medication strength  · how much to take        CONTINUE taking these medications    aspirin 81 MG EC tablet  Commonly known as:  ECOTRIN  Take 81 mg by mouth once daily.     busPIRone 7.5 MG tablet  Commonly known as:  BUSPAR  Take 7.5 mg by mouth 2 (two) times daily.     fluticasone 50 mcg/actuation nasal spray  Commonly known as:  FLONASE  1 spray by Nasal route.     hydroxychloroquine 200 mg tablet  Commonly known as:  PLAQUENIL  Take by mouth once daily.     ipratropium 42 mcg (0.06 %) nasal spray  Commonly known as:  ATROVENT  2 sprays by Nasal route.     methotrexate 2.5 MG Tab  Take 2.5 mg by mouth every 7 days. Take 4 tablets every Wednesday        STOP taking these medications    hydroCHLOROthiazide 25 MG tablet  Commonly known as:  HYDRODIURIL     verapamil 240 MG CR tablet  Commonly known as:  CALAN-SR            Indwelling Lines/Drains at time of discharge:   Lines/Drains/Airways          None          Time spent on the discharge of patient: 35 minutes  Patient was seen and examined on the date of discharge and determined to be suitable for discharge.         Geovanny Brady MD  Department of Hospital Medicine  [facility]

## 2019-03-23 NOTE — PLAN OF CARE
Problem: Physical Therapy Goal  Goal: Physical Therapy Goal  LTGS to be met by 3/28/19  1. Pt will perform bed mobility with Supervision  2. Pt will perform sit<>stand functional t/fs with Supervision  3. Pt will ambulate ~150ft with/ without AD with Supervision  4. Pt will demo Good dynamic balance  5. Pt will tolerate 1 x 20 reps (B) LE ROM exercises   Outcome: Ongoing (interventions implemented as appropriate)  Increased ambulation distance to ~ 175 feet with RW, CGA-min assist.

## 2019-03-23 NOTE — PT/OT/SLP PROGRESS
Physical Therapy  Treatment    Khang Patterson   MRN: [id number]   Admitting Diagnosis: Near syncope    PT Received On: 03/23/19  PT Start Time: 0755     PT Stop Time: 0810    PT Total Time (min): 15 min       Billable Minutes:  Gait Training 15 minutes    Treatment Type: Treatment  PT/PTA: PTA     PTA Visit Number: 1       General Precautions: Standard, fall  Orthopedic Precautions: N/A   Braces:      Spiritual, Cultural Beliefs, [employer] Practices, Values that Affect Care: no    Subjective:  Communicated with epic and nurse Corona prior to session.      Pain/Comfort  Pain Rating 1: 0/10    Objective:   Patient found with: peripheral IV, telemetry    Functional Mobility:  Bed Mobility: SBA       Transfers:CGA       Gait: CGA-min assist       Stairs:n/a          Balance:   Static Sit: GOOD-: Takes MODERATE challenges from all directions but inconsistently  Dynamic Sit: GOOD-: Incosistently Maintains balance through MODERATE excursions of active trunk movement,     Static Stand: FAIR: Maintains without assist but unable to take challenges  Dynamic stand: FAIR: Needs CONTACT GUARD during gait     Therapeutic Activities and Exercises:  SBA with bed mobility and CGA with transfers. Gait training with RW and CGA with intermittent min assist to correct gait deviations. Patient tended to drift towards the right when ambulating.  Ambulated ~ 175 feet with slow sravani and decreased step length.    AM-PAC 6 CLICK MOBILITY  How much help from another person does this patient currently need?   1 = Unable, Total/Dependent Assistance  2 = A lot, Maximum/Moderate Assistance  3 = A little, Minimum/Contact Guard/Supervision  4 = None, Modified [place]/Independent    Turning over in bed (including adjusting bedclothes, sheets and blankets)?: 4  Sitting down on and standing up from a chair with arms (e.g., wheelchair, bedside commode, etc.): 3  Moving from lying on back to sitting on the side of the bed?: 3  Moving to and from a  bed to a chair (including a wheelchair)?: 3  Need to walk in hospital room?: 3  Climbing 3-5 steps with a railing?: 1  Basic Mobility Total Score: 17    AM-PAC Raw Score CMS G-Code Modifier Level of Impairment Assistance   6 % Total / Unable   7 - 9 CM 80 - 100% Maximal Assist   10 - 14 CL 60 - 80% Moderate Assist   15 - 19 CK 40 - 60% Moderate Assist   20 - 22 CJ 20 - 40% Minimal Assist   23 CI 1-20% SBA / CGA   24 CH 0% Independent/ Mod I     Patient left supine with all lines intact, call button in reach, nurding notified and spouse present.    Assessment:  Khang Patterson is a 78 y.o. male with a medical diagnosis of Near syncope.    Rehab identified problem list/impairments: Rehab identified problem list/impairments: weakness, impaired endurance, decreased safety awareness, impaired balance, decreased coordination    Rehab potential is good.    Activity tolerance: Good    Discharge recommendations: Discharge Facility/Level of Care Needs: other (see comments)([facility])     Barriers to discharge:      Equipment recommendations:       GOALS:   Multidisciplinary Problems     Physical Therapy Goals        Problem: Physical Therapy Goal    Goal Priority Disciplines Outcome Goal Variances Interventions   Physical Therapy Goal     PT, PT/OT Ongoing (interventions implemented as appropriate)     Description:  LTGS to be met by 3/28/19  1. Pt will perform bed mobility with Supervision  2. Pt will perform sit<>stand functional t/fs with Supervision  3. Pt will ambulate ~150ft with/ without AD with Supervision  4. Pt will demo Good dynamic balance  5. Pt will tolerate 1 x 20 reps (B) LE ROM exercises                    PLAN:    Patient to be seen 5 x/week  to address the above listed problems via gait training, therapeutic activities, therapeutic exercises  Plan of Care expires: 03/28/19  Plan of Care reviewed with: patient, spouse         Gaston Cyril, PTA  03/23/2019

## 2019-03-23 NOTE — NURSING
Went over discharge instructions with patient and family members present at bedside.  Stressed importance of making and keeping all follow ups as well as making prescribed medication changes.   Patient verbalized understanding and has no questions in regards to discharge.  IV removed, catheter intact.  Telemetry box 8623 removed and returned to monitor tech.  Pt instructed to call nurse station once dressed and ready to be discharged via wheelchair to personal transportation.  Primary nurse notified of pt's discharge status.

## 2019-03-23 NOTE — ASSESSMENT & PLAN NOTE
- Na improved 127  - Currently asymptomatic  - Continue NS at 125 ml/hour  - Daily BMP  - Anticipate DC home in AM if Na improves overnight  - May need nephrology consult pending clinical course  - Monitor    Stable  Na 126 today

## 2019-03-25 NOTE — PLAN OF CARE
Received call from Joyce Bailey at [facility]. Requested orders faxed via [email or web address] to [facility]. Patient discharged Saturday.       03/25/19 1011   Final Note   Assessment Type Final Discharge Note   Anticipated Discharge Disposition Home-Health   Right Care Referral Info   Post Acute Recommendation Home-care   Facility Name [facility]

## 2019-03-28 ENCOUNTER — TELEPHONE (OUTPATIENT)
Dept: CARDIOLOGY | Facility: CLINIC | Age: 79
End: 2019-03-28

## 2019-03-28 NOTE — TELEPHONE ENCOUNTER
----- Message from Isidro Barrios sent at 3/28/2019 11:18 AM CDT -----  Contact: Maia, pt's friend  She's calling in regard to the removal of the pt lorie holbrook advise, [phone] (cell)

## 2019-03-28 NOTE — TELEPHONE ENCOUNTER
I spoke with Maia. I let her know the provider can take a look at the stitches tomorrow at the visit

## 2019-03-29 ENCOUNTER — OFFICE VISIT (OUTPATIENT)
Dept: CARDIOLOGY | Facility: CLINIC | Age: 79
End: 2019-03-29
Payer: MEDICARE

## 2019-03-29 ENCOUNTER — LAB VISIT (OUTPATIENT)
Dept: LAB | Facility: HOSPITAL | Age: 79
End: 2019-03-29
Attending: NURSE PRACTITIONER
Payer: MEDICARE

## 2019-03-29 ENCOUNTER — TELEPHONE (OUTPATIENT)
Dept: CARDIOLOGY | Facility: CLINIC | Age: 79
End: 2019-03-29

## 2019-03-29 VITALS
OXYGEN SATURATION: 97 % | DIASTOLIC BLOOD PRESSURE: 62 MMHG | HEIGHT: 69 IN | SYSTOLIC BLOOD PRESSURE: 116 MMHG | WEIGHT: 152.31 LBS | HEART RATE: 71 BPM | BODY MASS INDEX: 22.56 KG/M2

## 2019-03-29 DIAGNOSIS — R79.89 ELEVATED TROPONIN: ICD-10-CM

## 2019-03-29 DIAGNOSIS — R55 NEAR SYNCOPE: ICD-10-CM

## 2019-03-29 DIAGNOSIS — I10 BENIGN ESSENTIAL HTN: ICD-10-CM

## 2019-03-29 DIAGNOSIS — I10 BENIGN ESSENTIAL HTN: Primary | ICD-10-CM

## 2019-03-29 LAB
ALBUMIN SERPL BCP-MCNC: 3.2 G/DL (ref 3.5–5.2)
ALP SERPL-CCNC: 40 U/L (ref 55–135)
ALT SERPL W/O P-5'-P-CCNC: 28 U/L (ref 10–44)
ANION GAP SERPL CALC-SCNC: 8 MMOL/L (ref 8–16)
AST SERPL-CCNC: 24 U/L (ref 10–40)
BILIRUB SERPL-MCNC: 0.6 MG/DL (ref 0.1–1)
BUN SERPL-MCNC: 18 MG/DL (ref 8–23)
CALCIUM SERPL-MCNC: 9.4 MG/DL (ref 8.7–10.5)
CHLORIDE SERPL-SCNC: 98 MMOL/L (ref 95–110)
CO2 SERPL-SCNC: 27 MMOL/L (ref 23–29)
CREAT SERPL-MCNC: 1 MG/DL (ref 0.5–1.4)
EST. GFR  (AFRICAN AMERICAN): >60 ML/MIN/1.73 M^2
EST. GFR  (NON AFRICAN AMERICAN): >60 ML/MIN/1.73 M^2
GLUCOSE SERPL-MCNC: 104 MG/DL (ref 70–110)
MAGNESIUM SERPL-MCNC: 2.2 MG/DL (ref 1.6–2.6)
POTASSIUM SERPL-SCNC: 4 MMOL/L (ref 3.5–5.1)
PROT SERPL-MCNC: 6.8 G/DL (ref 6–8.4)
SODIUM SERPL-SCNC: 133 MMOL/L (ref 136–145)

## 2019-03-29 PROCEDURE — 3074F SYST BP LT 130 MM HG: CPT | Mod: CPTII,S$GLB,, | Performed by: NURSE PRACTITIONER

## 2019-03-29 PROCEDURE — 80053 COMPREHEN METABOLIC PANEL: CPT

## 2019-03-29 PROCEDURE — 99214 OFFICE O/P EST MOD 30 MIN: CPT | Mod: S$GLB,,, | Performed by: NURSE PRACTITIONER

## 2019-03-29 PROCEDURE — 36415 COLL VENOUS BLD VENIPUNCTURE: CPT

## 2019-03-29 PROCEDURE — 3078F DIAST BP <80 MM HG: CPT | Mod: CPTII,S$GLB,, | Performed by: NURSE PRACTITIONER

## 2019-03-29 PROCEDURE — 83735 ASSAY OF MAGNESIUM: CPT

## 2019-03-29 PROCEDURE — 3078F PR MOST RECENT DIASTOLIC BLOOD PRESSURE < 80 MM HG: ICD-10-PCS | Mod: CPTII,S$GLB,, | Performed by: NURSE PRACTITIONER

## 2019-03-29 PROCEDURE — 99214 PR OFFICE/OUTPT VISIT, EST, LEVL IV, 30-39 MIN: ICD-10-PCS | Mod: S$GLB,,, | Performed by: NURSE PRACTITIONER

## 2019-03-29 PROCEDURE — 99999 PR PBB SHADOW E&M-EST. PATIENT-LVL III: CPT | Mod: PBBFAC,,, | Performed by: NURSE PRACTITIONER

## 2019-03-29 PROCEDURE — 99999 PR PBB SHADOW E&M-EST. PATIENT-LVL III: ICD-10-PCS | Mod: PBBFAC,,, | Performed by: NURSE PRACTITIONER

## 2019-03-29 PROCEDURE — 3074F PR MOST RECENT SYSTOLIC BLOOD PRESSURE < 130 MM HG: ICD-10-PCS | Mod: CPTII,S$GLB,, | Performed by: NURSE PRACTITIONER

## 2019-03-29 NOTE — TELEPHONE ENCOUNTER
Please let patient know that his K+, Mg and NA look good on labs today. Continue current medical management as discussed in visit today

## 2019-03-29 NOTE — PROGRESS NOTES
Subjective:   Patient ID:  Khang Patterson is a 78 y.o. male who presents for follow up of Hospital Follow Up      HPI  Mr. Patterson is a 79 y/o who was admitted last week after having near syncopal episode. He had a fall due to dizziness, hitting his head and right knee on the concrete. ED workup revealed + orthostatic vitlas.  EKG showed Sinus rhythm with sinus arrhythmia with 1st degree A-V block.  CBC unremarkable.  D dimer elevated to 3.91.  CTA chest negative for PE.  BMP revealed Na of 128, KCL 2.9, Cl 90, . Troponin 0.021, 0.387, 0.193    K+ replaced. Treated with IVF and Na corrected. Verapamil held as BP treated. HCTZ stopped. Started on Candasartan during admit.   CT head showed a small hematoma overlying the right side of the frontal bone.  No calvarial fracture or intracranial hemorrhage.  There is mild generalized cerebral and cerebellar atrophy.  There are chronic appearing ischemic changes in the deep white matter of both cerebral hemispheres.  There is no evidence of an acute ischemic event  Echo showed normal LVF.   Carotid US unremarkable.   Hasn't had any recurrent symptoms since discharge     Past Medical History:   Diagnosis Date    Dizziness 3/20/2019    Hypertension     Hypokalemia 3/20/2019    Rheumatoid arthritis        Past Surgical History:   Procedure Laterality Date    PROSTATECTOMY         Social History     Tobacco Use    Smoking status: Never Smoker   Substance Use Topics    Alcohol use: Yes     Comment: social    Drug use: No       History reviewed. No pertinent family history.    Current Outpatient Medications   Medication Sig    aspirin (ECOTRIN) 81 MG EC tablet Take 81 mg by mouth once daily.    atenolol (TENORMIN) 25 MG tablet Take 1 tablet (25 mg total) by mouth once daily.    busPIRone (BUSPAR) 7.5 MG tablet Take 7.5 mg by mouth 2 (two) times daily.    candesartan (ATACAND) 4 MG tablet Take 1 tablet (4 mg total) by mouth once daily.    cloNIDine (CATAPRES) 0.1  MG tablet Take 1 tablet (0.1 mg total) by mouth 2 (two) times daily.    fluticasone (FLONASE) 50 mcg/actuation nasal spray 1 spray by Nasal route.    hydroxychloroquine (PLAQUENIL) 200 mg tablet Take by mouth once daily.    ipratropium (ATROVENT) 42 mcg (0.06 %) nasal spray 2 sprays by Nasal route.    methotrexate 2.5 MG Tab Take 2.5 mg by mouth every 7 days. Take 4 tablets every Wednesday     No current facility-administered medications for this visit.      Current Outpatient Medications on File Prior to Visit   Medication Sig    aspirin (ECOTRIN) 81 MG EC tablet Take 81 mg by mouth once daily.    atenolol (TENORMIN) 25 MG tablet Take 1 tablet (25 mg total) by mouth once daily.    busPIRone (BUSPAR) 7.5 MG tablet Take 7.5 mg by mouth 2 (two) times daily.    candesartan (ATACAND) 4 MG tablet Take 1 tablet (4 mg total) by mouth once daily.    cloNIDine (CATAPRES) 0.1 MG tablet Take 1 tablet (0.1 mg total) by mouth 2 (two) times daily.    fluticasone (FLONASE) 50 mcg/actuation nasal spray 1 spray by Nasal route.    hydroxychloroquine (PLAQUENIL) 200 mg tablet Take by mouth once daily.    ipratropium (ATROVENT) 42 mcg (0.06 %) nasal spray 2 sprays by Nasal route.    methotrexate 2.5 MG Tab Take 2.5 mg by mouth every 7 days. Take 4 tablets every Wednesday     No current facility-administered medications on file prior to visit.        Review of Systems   Constitution: Negative for diaphoresis, malaise/fatigue, weight gain and weight loss.   HENT: Negative for congestion and nosebleeds.    Cardiovascular: Negative for chest pain, claudication, cyanosis, dyspnea on exertion, irregular heartbeat, leg swelling, near-syncope, orthopnea, palpitations, paroxysmal nocturnal dyspnea and syncope.   Respiratory: Negative for cough, hemoptysis, shortness of breath, sleep disturbances due to breathing, snoring, sputum production and wheezing.    Hematologic/Lymphatic: Negative for bleeding problem. Does not bruise/bleed  "easily.   Skin: Negative for rash.   Musculoskeletal: Negative for arthritis, back pain, falls, joint pain, muscle cramps and muscle weakness.   Gastrointestinal: Negative for abdominal pain, constipation, diarrhea, heartburn, hematemesis, hematochezia, melena and nausea.   Genitourinary: Negative for dysuria, hematuria and nocturia.   Neurological: Negative for excessive daytime sleepiness, dizziness, headaches, light-headedness, loss of balance, numbness, vertigo and weakness.       Objective:   Physical Exam   Constitutional: He is oriented to person, place, and time. He appears well-developed and well-nourished.   Neck: Neck supple. No JVD present.   Cardiovascular: Normal rate, regular rhythm, normal heart sounds and normal pulses. Exam reveals no friction rub.   No murmur heard.  Pulmonary/Chest: Effort normal and breath sounds normal. No respiratory distress. He has no wheezes. He has no rales.   Abdominal: Soft. Bowel sounds are normal. He exhibits no distension.   Musculoskeletal: He exhibits no edema or tenderness.   Neurological: He is alert and oriented to person, place, and time.   Skin: Skin is warm and dry. No rash noted.   Healing abrasion to right eyebrow and bruising to right neck    Psychiatric: He has a normal mood and affect. His behavior is normal.   Nursing note and vitals reviewed.    Vitals:    03/29/19 0926   BP: 116/62   Pulse: 71   SpO2: 97%   Weight: 69.1 kg (152 lb 5.4 oz)   Height: 5' 9" (1.753 m)     No results found for: CHOL  No results found for: HDL  No results found for: LDLCALC  No results found for: TRIG  No results found for: CHOLHDL    Chemistry        Component Value Date/Time     (L) 03/23/2019 0423    K 3.2 (L) 03/23/2019 0423    CL 93 (L) 03/23/2019 0423    CO2 21 (L) 03/23/2019 0423    BUN 10 03/23/2019 0423    CREATININE 0.7 03/23/2019 0423    GLU 94 03/23/2019 0423        Component Value Date/Time    CALCIUM 8.4 (L) 03/23/2019 0423    ALKPHOS 45 (L) 03/20/2019 " 1407    AST 22 03/20/2019 1407    ALT 16 03/20/2019 1407    BILITOT 1.2 (H) 03/20/2019 1407    ESTGFRAFRICA >60 03/23/2019 0423    EGFRNONAA >60 03/23/2019 0423          No results found for: TSH  No results found for: INR, PROTIME  Lab Results   Component Value Date    WBC 9.84 03/23/2019    HGB 12.6 (L) 03/23/2019    HCT 34.8 (L) 03/23/2019    MCV 90 03/23/2019     03/23/2019     BMP  Sodium   Date Value Ref Range Status   03/23/2019 126 (L) 136 - 145 mmol/L Final     Potassium   Date Value Ref Range Status   03/23/2019 3.2 (L) 3.5 - 5.1 mmol/L Final     Chloride   Date Value Ref Range Status   03/23/2019 93 (L) 95 - 110 mmol/L Final     CO2   Date Value Ref Range Status   03/23/2019 21 (L) 23 - 29 mmol/L Final     BUN, Bld   Date Value Ref Range Status   03/23/2019 10 8 - 23 mg/dL Final     Creatinine   Date Value Ref Range Status   03/23/2019 0.7 0.5 - 1.4 mg/dL Final     Calcium   Date Value Ref Range Status   03/23/2019 8.4 (L) 8.7 - 10.5 mg/dL Final     Anion Gap   Date Value Ref Range Status   03/23/2019 12 8 - 16 mmol/L Final     eGFR if    Date Value Ref Range Status   03/23/2019 >60 >60 mL/min/1.73 m^2 Final     eGFR if non    Date Value Ref Range Status   03/23/2019 >60 >60 mL/min/1.73 m^2 Final     Comment:     Calculation used to obtain the estimated glomerular filtration  rate (eGFR) is the CKD-EPI equation.        Estimated Creatinine Clearance: 83.6 mL/min (based on SCr of 0.7 mg/dL).    Assessment:     1. Benign essential HTN    2. Near syncope    3. Elevated troponin        Plan:   Benign essential HTN  Continue Candesartan     Near syncope  -     NM Myocardial Perfusion Spect Multi Pharmacologic; Future; Expected date: 03/29/2019  -     NM Multi Pharm Stress Cardiac Component; Future  -     Holter monitor - 48 hour    Elevated troponin  -     NM Myocardial Perfusion Spect Multi Pharmacologic; Future; Expected date: 03/29/2019  -     NM Multi Pharm Stress  Cardiac Component; Future  Possible demand from dehydration and hypokalemia      Stress test to rule out ischemia and 48 hour holter to evaluate for arrhythmia.   Phone review of results   RTC in 6 months or sooner

## 2019-04-08 ENCOUNTER — HOSPITAL ENCOUNTER (OUTPATIENT)
Dept: RADIOLOGY | Facility: HOSPITAL | Age: 79
Discharge: HOME | End: 2019-04-08
Attending: NURSE PRACTITIONER
Payer: MEDICARE

## 2019-04-08 ENCOUNTER — TELEPHONE (OUTPATIENT)
Dept: CARDIOLOGY | Facility: CLINIC | Age: 79
End: 2019-04-08

## 2019-04-08 ENCOUNTER — HOSPITAL ENCOUNTER (OUTPATIENT)
Dept: CARDIOLOGY | Facility: HOSPITAL | Age: 79
Discharge: HOME | End: 2019-04-08
Attending: NURSE PRACTITIONER
Payer: MEDICARE

## 2019-04-08 DIAGNOSIS — R79.89 ELEVATED TROPONIN: ICD-10-CM

## 2019-04-08 DIAGNOSIS — R55 NEAR SYNCOPE: ICD-10-CM

## 2019-04-08 LAB
CV STRESS BASE HR: 64 BPM
DIASTOLIC BLOOD PRESSURE: 84 MMHG
NUC STRESS EJECTION FRACTION: 56 %
OHS CV CPX 85 PERCENT MAX PREDICTED HEART RATE MALE: 120
OHS CV CPX MAX PREDICTED HEART RATE: 141
OHS CV CPX PATIENT IS FEMALE: 0
OHS CV CPX PATIENT IS MALE: 1
OHS CV CPX PEAK DIASTOLIC BLOOD PRESSURE: 75 MMHG
OHS CV CPX PEAK HEAR RATE: 85 BPM
OHS CV CPX PEAK RATE PRESSURE PRODUCT: NORMAL
OHS CV CPX PEAK SYSTOLIC BLOOD PRESSURE: 135 MMHG
OHS CV CPX PERCENT MAX PREDICTED HEART RATE ACHIEVED: 60
OHS CV CPX RATE PRESSURE PRODUCT PRESENTING: NORMAL
SYSTOLIC BLOOD PRESSURE: 180 MMHG

## 2019-04-08 PROCEDURE — 93225 XTRNL ECG REC<48 HRS REC: CPT

## 2019-04-08 PROCEDURE — 78452 STRESS TEST WITH MYOCARDIAL PERFUSION (CUPID ONLY): ICD-10-PCS | Mod: 26,,, | Performed by: INTERNAL MEDICINE

## 2019-04-08 PROCEDURE — A9502 TC99M TETROFOSMIN: HCPCS

## 2019-04-08 PROCEDURE — 93227 XTRNL ECG REC<48 HR R&I: CPT | Mod: ,,, | Performed by: INTERNAL MEDICINE

## 2019-04-08 PROCEDURE — 93016 STRESS TEST WITH MYOCARDIAL PERFUSION (CUPID ONLY): ICD-10-PCS | Mod: ,,, | Performed by: INTERNAL MEDICINE

## 2019-04-08 PROCEDURE — 63600175 PHARM REV CODE 636 W HCPCS: Performed by: INTERNAL MEDICINE

## 2019-04-08 PROCEDURE — 78452 HT MUSCLE IMAGE SPECT MULT: CPT | Mod: 26,,, | Performed by: INTERNAL MEDICINE

## 2019-04-08 PROCEDURE — 93018 CV STRESS TEST I&R ONLY: CPT | Mod: ,,, | Performed by: INTERNAL MEDICINE

## 2019-04-08 PROCEDURE — 93018 STRESS TEST WITH MYOCARDIAL PERFUSION (CUPID ONLY): ICD-10-PCS | Mod: ,,, | Performed by: INTERNAL MEDICINE

## 2019-04-08 PROCEDURE — 93017 CV STRESS TEST TRACING ONLY: CPT

## 2019-04-08 PROCEDURE — 93016 CV STRESS TEST SUPVJ ONLY: CPT | Mod: ,,, | Performed by: INTERNAL MEDICINE

## 2019-04-08 PROCEDURE — 93227 HOLTER MONITOR - 48 HOUR (CUPID ONLY): ICD-10-PCS | Mod: ,,, | Performed by: INTERNAL MEDICINE

## 2019-04-08 RX ORDER — REGADENOSON 0.08 MG/ML
0.4 INJECTION, SOLUTION INTRAVENOUS ONCE
Status: COMPLETED | OUTPATIENT
Start: 2019-04-08 | End: 2019-04-08

## 2019-04-08 RX ADMIN — REGADENOSON 0.4 MG: 0.08 INJECTION, SOLUTION INTRAVENOUS at 09:04

## 2019-04-10 ENCOUNTER — TELEPHONE (OUTPATIENT)
Dept: CARDIOLOGY | Facility: HOSPITAL | Age: 79
End: 2019-04-10

## 2019-04-10 LAB
OHS CV EVENT MONITOR DAY: 2
OHS CV HOLTER LENGTH DECIMAL HOURS: 95.98
OHS CV HOLTER LENGTH HOURS: 47
OHS CV HOLTER LENGTH MINUTES: 59

## 2019-04-10 NOTE — TELEPHONE ENCOUNTER
Please inform patient that the monitor showed premature atrial and ventricle beats. He may want to see EP for ILR consideration in case there is concern for arrhythmia. Can we see about him scheduling an appt with someone in the area where he lives now. He needs to establish care with a Cardiologist closer to home if he doesn't want to drive to [place]

## 2019-04-11 NOTE — TELEPHONE ENCOUNTER
Spoke with pt's wife notified pt and pt wife the information.    I offered pt an appt to see Dr. Simon next week on 4/16/19 states they want somewhere in [facility].      I offered to schedule pt an appt at [facility] with cardiology. Pt wife states she will call back to schedule appt. I provided pt wife with appt desk number.

## 2019-11-12 ENCOUNTER — EXTERNAL HOME HEALTH (OUTPATIENT)
Dept: HOME HEALTH SERVICES | Facility: HOSPITAL | Age: 79
End: 2019-11-12

## 2019-11-12 ENCOUNTER — EXTERNAL HOME HEALTH (OUTPATIENT)
Dept: HOME HEALTH SERVICES | Facility: HOSPITAL | Age: 79
End: 2019-11-12
Payer: MEDICARE

## 2021-02-10 ENCOUNTER — HOSPITAL ENCOUNTER (EMERGENCY)
Facility: HOSPITAL | Age: 81
Discharge: HOME | End: 2021-02-11
Attending: EMERGENCY MEDICINE
Payer: MEDICARE

## 2021-02-10 DIAGNOSIS — S01.01XA LACERATION OF SCALP: ICD-10-CM

## 2021-02-10 DIAGNOSIS — R29.6 FREQUENT FALLS: ICD-10-CM

## 2021-02-10 DIAGNOSIS — S01.81XA FACIAL LACERATION, INITIAL ENCOUNTER: ICD-10-CM

## 2021-02-10 DIAGNOSIS — S01.111A LACERATION OF RIGHT EYEBROW, INITIAL ENCOUNTER: Primary | ICD-10-CM

## 2021-02-10 DIAGNOSIS — E86.0 DEHYDRATION: ICD-10-CM

## 2021-02-10 DIAGNOSIS — S02.2XXA CLOSED FRACTURE OF NASAL BONE, INITIAL ENCOUNTER: ICD-10-CM

## 2021-02-10 LAB
ALBUMIN SERPL BCP-MCNC: 3.6 G/DL (ref 3.5–5.2)
ALP SERPL-CCNC: 57 U/L (ref 55–135)
ALT SERPL W/O P-5'-P-CCNC: 22 U/L (ref 10–44)
ANION GAP SERPL CALC-SCNC: 12 MMOL/L (ref 8–16)
AST SERPL-CCNC: 20 U/L (ref 10–40)
BACTERIA #/AREA URNS HPF: ABNORMAL /HPF
BASOPHILS # BLD AUTO: 0.03 K/UL (ref 0–0.2)
BASOPHILS NFR BLD: 0.3 % (ref 0–1.9)
BILIRUB SERPL-MCNC: 0.5 MG/DL (ref 0.1–1)
BILIRUB UR QL STRIP: NEGATIVE
BUN SERPL-MCNC: 38 MG/DL (ref 8–23)
CALCIUM SERPL-MCNC: 8.8 MG/DL (ref 8.7–10.5)
CHLORIDE SERPL-SCNC: 103 MMOL/L (ref 95–110)
CLARITY UR: CLEAR
CO2 SERPL-SCNC: 24 MMOL/L (ref 23–29)
COLOR UR: YELLOW
CREAT SERPL-MCNC: 1.7 MG/DL (ref 0.5–1.4)
DIFFERENTIAL METHOD: ABNORMAL
EOSINOPHIL # BLD AUTO: 0.3 K/UL (ref 0–0.5)
EOSINOPHIL NFR BLD: 2.9 % (ref 0–8)
ERYTHROCYTE [DISTWIDTH] IN BLOOD BY AUTOMATED COUNT: 13.8 % (ref 11.5–14.5)
EST. GFR  (AFRICAN AMERICAN): 43 ML/MIN/1.73 M^2
EST. GFR  (NON AFRICAN AMERICAN): 37 ML/MIN/1.73 M^2
GLUCOSE SERPL-MCNC: 141 MG/DL (ref 70–110)
GLUCOSE UR QL STRIP: NEGATIVE
HCT VFR BLD AUTO: 35.4 % (ref 40–54)
HGB BLD-MCNC: 11.8 G/DL (ref 14–18)
HGB UR QL STRIP: ABNORMAL
IMM GRANULOCYTES # BLD AUTO: 0.09 K/UL (ref 0–0.04)
IMM GRANULOCYTES NFR BLD AUTO: 1 % (ref 0–0.5)
KETONES UR QL STRIP: NEGATIVE
LEUKOCYTE ESTERASE UR QL STRIP: ABNORMAL
LYMPHOCYTES # BLD AUTO: 0.5 K/UL (ref 1–4.8)
LYMPHOCYTES NFR BLD: 5.5 % (ref 18–48)
MCH RBC QN AUTO: 32.2 PG (ref 27–31)
MCHC RBC AUTO-ENTMCNC: 33.3 G/DL (ref 32–36)
MCV RBC AUTO: 97 FL (ref 82–98)
MICROSCOPIC COMMENT: ABNORMAL
MONOCYTES # BLD AUTO: 0.7 K/UL (ref 0.3–1)
MONOCYTES NFR BLD: 8.2 % (ref 4–15)
NEUTROPHILS # BLD AUTO: 7.5 K/UL (ref 1.8–7.7)
NEUTROPHILS NFR BLD: 82.1 % (ref 38–73)
NITRITE UR QL STRIP: NEGATIVE
NRBC BLD-RTO: 0 /100 WBC
PH UR STRIP: 6 [PH] (ref 5–8)
PLATELET # BLD AUTO: 168 K/UL (ref 150–350)
PMV BLD AUTO: 10.2 FL (ref 9.2–12.9)
POTASSIUM SERPL-SCNC: 3.5 MMOL/L (ref 3.5–5.1)
PROT SERPL-MCNC: 6.8 G/DL (ref 6–8.4)
PROT UR QL STRIP: NEGATIVE
RBC # BLD AUTO: 3.67 M/UL (ref 4.6–6.2)
RBC #/AREA URNS HPF: 6 /HPF (ref 0–4)
SODIUM SERPL-SCNC: 139 MMOL/L (ref 136–145)
SP GR UR STRIP: 1.02 (ref 1–1.03)
URN SPEC COLLECT METH UR: ABNORMAL
UROBILINOGEN UR STRIP-ACNC: NEGATIVE EU/DL
WBC # BLD AUTO: 9.07 K/UL (ref 3.9–12.7)
WBC #/AREA URNS HPF: 8 /HPF (ref 0–5)

## 2021-02-10 PROCEDURE — 96360 HYDRATION IV INFUSION INIT: CPT | Mod: 59

## 2021-02-10 PROCEDURE — 63600175 PHARM REV CODE 636 W HCPCS: Performed by: EMERGENCY MEDICINE

## 2021-02-10 PROCEDURE — 25000003 PHARM REV CODE 250: Performed by: EMERGENCY MEDICINE

## 2021-02-10 PROCEDURE — 81000 URINALYSIS NONAUTO W/SCOPE: CPT

## 2021-02-10 PROCEDURE — 80053 COMPREHEN METABOLIC PANEL: CPT

## 2021-02-10 PROCEDURE — 93010 ELECTROCARDIOGRAM REPORT: CPT | Mod: ,,, | Performed by: INTERNAL MEDICINE

## 2021-02-10 PROCEDURE — 93010 EKG 12-LEAD: ICD-10-PCS | Mod: ,,, | Performed by: INTERNAL MEDICINE

## 2021-02-10 PROCEDURE — 93005 ELECTROCARDIOGRAM TRACING: CPT

## 2021-02-10 PROCEDURE — 96361 HYDRATE IV INFUSION ADD-ON: CPT | Mod: 59

## 2021-02-10 PROCEDURE — 99284 EMERGENCY DEPT VISIT MOD MDM: CPT | Mod: 25

## 2021-02-10 PROCEDURE — 36415 COLL VENOUS BLD VENIPUNCTURE: CPT

## 2021-02-10 PROCEDURE — 85025 COMPLETE CBC W/AUTO DIFF WBC: CPT

## 2021-02-10 PROCEDURE — 90715 TDAP VACCINE 7 YRS/> IM: CPT | Performed by: EMERGENCY MEDICINE

## 2021-02-10 PROCEDURE — 90471 IMMUNIZATION ADMIN: CPT | Performed by: EMERGENCY MEDICINE

## 2021-02-10 PROCEDURE — 12011 RPR F/E/E/N/L/M 2.5 CM/<: CPT

## 2021-02-10 RX ORDER — CANDESARTAN 8 MG/1
8 TABLET ORAL
COMMUNITY
Start: 2020-11-06

## 2021-02-10 RX ORDER — CLONIDINE HYDROCHLORIDE 0.2 MG/1
0.2 TABLET ORAL
Status: DISCONTINUED | OUTPATIENT
Start: 2021-02-10 | End: 2021-02-11 | Stop reason: HOSPADM

## 2021-02-10 RX ORDER — CHLORTHALIDONE 25 MG/1
25 TABLET ORAL
COMMUNITY
Start: 2020-09-16 | End: 2021-09-16

## 2021-02-10 RX ORDER — LIDOCAINE HYDROCHLORIDE 10 MG/ML
10 INJECTION, SOLUTION EPIDURAL; INFILTRATION; INTRACAUDAL; PERINEURAL
Status: COMPLETED | OUTPATIENT
Start: 2021-02-10 | End: 2021-02-10

## 2021-02-10 RX ORDER — ATENOLOL 50 MG/1
TABLET ORAL
COMMUNITY
Start: 2021-01-04

## 2021-02-10 RX ADMIN — BACITRACIN, NEOMYCIN, POLYMYXIN B 1 EACH: 400; 3.5; 5 OINTMENT TOPICAL at 11:02

## 2021-02-10 RX ADMIN — SODIUM CHLORIDE 1000 ML: 0.9 INJECTION, SOLUTION INTRAVENOUS at 09:02

## 2021-02-10 RX ADMIN — LIDOCAINE HYDROCHLORIDE 100 MG: 10 INJECTION, SOLUTION EPIDURAL; INFILTRATION; INTRACAUDAL; PERINEURAL at 10:02

## 2021-02-10 RX ADMIN — CLOSTRIDIUM TETANI TOXOID ANTIGEN (FORMALDEHYDE INACTIVATED), CORYNEBACTERIUM DIPHTHERIAE TOXOID ANTIGEN (FORMALDEHYDE INACTIVATED), BORDETELLA PERTUSSIS TOXOID ANTIGEN (GLUTARALDEHYDE INACTIVATED), BORDETELLA PERTUSSIS FILAMENTOUS HEMAGGLUTININ ANTIGEN (FORMALDEHYDE INACTIVATED), BORDETELLA PERTUSSIS PERTACTIN ANTIGEN, AND BORDETELLA PERTUSSIS FIMBRIAE 2/3 ANTIGEN 0.5 ML: 5; 2; 2.5; 5; 3; 5 INJECTION, SUSPENSION INTRAMUSCULAR at 08:02

## 2021-02-11 ENCOUNTER — HOSPITAL ENCOUNTER (EMERGENCY)
Facility: HOSPITAL | Age: 81
Discharge: HOME | End: 2021-02-11
Attending: FAMILY MEDICINE
Payer: MEDICARE

## 2021-02-11 VITALS
SYSTOLIC BLOOD PRESSURE: 168 MMHG | HEIGHT: 68 IN | TEMPERATURE: 99 F | HEART RATE: 56 BPM | OXYGEN SATURATION: 97 % | RESPIRATION RATE: 18 BRPM | DIASTOLIC BLOOD PRESSURE: 72 MMHG | BODY MASS INDEX: 23.16 KG/M2

## 2021-02-11 VITALS
HEART RATE: 65 BPM | SYSTOLIC BLOOD PRESSURE: 154 MMHG | RESPIRATION RATE: 20 BRPM | TEMPERATURE: 98 F | DIASTOLIC BLOOD PRESSURE: 75 MMHG | BODY MASS INDEX: 23.16 KG/M2 | OXYGEN SATURATION: 97 % | HEIGHT: 68 IN

## 2021-02-11 DIAGNOSIS — S01.01XA LACERATION OF SCALP, INITIAL ENCOUNTER: ICD-10-CM
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
## 2021-02-11 LAB
ALBUMIN SERPL BCP-MCNC: 3.6 G/DL (ref 3.5–5.2)
ALP SERPL-CCNC: 57 U/L (ref 55–135)
ALT SERPL W/O P-5'-P-CCNC: 22 U/L (ref 10–44)
ANION GAP SERPL CALC-SCNC: 7 MMOL/L (ref 8–16)
APTT BLDCRRT: 25.3 SEC (ref 21–32)
AST SERPL-CCNC: 24 U/L (ref 10–40)
BASOPHILS # BLD AUTO: 0.02 K/UL (ref 0–0.2)
BASOPHILS NFR BLD: 0.2 % (ref 0–1.9)
BILIRUB SERPL-MCNC: 0.7 MG/DL (ref 0.1–1)
BUN SERPL-MCNC: 34 MG/DL (ref 8–23)
CALCIUM SERPL-MCNC: 8.4 MG/DL (ref 8.7–10.5)
CHLORIDE SERPL-SCNC: 103 MMOL/L (ref 95–110)
CO2 SERPL-SCNC: 28 MMOL/L (ref 23–29)
CREAT SERPL-MCNC: 1.4 MG/DL (ref 0.5–1.4)
DIFFERENTIAL METHOD: ABNORMAL
EOSINOPHIL # BLD AUTO: 0.1 K/UL (ref 0–0.5)
EOSINOPHIL NFR BLD: 1.6 % (ref 0–8)
ERYTHROCYTE [DISTWIDTH] IN BLOOD BY AUTOMATED COUNT: 13.8 % (ref 11.5–14.5)
EST. GFR  (AFRICAN AMERICAN): 54 ML/MIN/1.73 M^2
EST. GFR  (NON AFRICAN AMERICAN): 47 ML/MIN/1.73 M^2
GLUCOSE SERPL-MCNC: 116 MG/DL (ref 70–110)
HCT VFR BLD AUTO: 35.5 % (ref 40–54)
HGB BLD-MCNC: 11.8 G/DL (ref 14–18)
IMM GRANULOCYTES # BLD AUTO: 0.07 K/UL (ref 0–0.04)
IMM GRANULOCYTES NFR BLD AUTO: 0.8 % (ref 0–0.5)
INR PPP: 1 (ref 0.8–1.2)
LYMPHOCYTES # BLD AUTO: 0.5 K/UL (ref 1–4.8)
LYMPHOCYTES NFR BLD: 5 % (ref 18–48)
MCH RBC QN AUTO: 32.2 PG (ref 27–31)
MCHC RBC AUTO-ENTMCNC: 33.2 G/DL (ref 32–36)
MCV RBC AUTO: 97 FL (ref 82–98)
MONOCYTES # BLD AUTO: 0.7 K/UL (ref 0.3–1)
MONOCYTES NFR BLD: 7.3 % (ref 4–15)
NEUTROPHILS # BLD AUTO: 7.7 K/UL (ref 1.8–7.7)
NEUTROPHILS NFR BLD: 85.1 % (ref 38–73)
NRBC BLD-RTO: 0 /100 WBC
PLATELET # BLD AUTO: 172 K/UL (ref 150–350)
PMV BLD AUTO: 9.9 FL (ref 9.2–12.9)
POTASSIUM SERPL-SCNC: 3.5 MMOL/L (ref 3.5–5.1)
PROT SERPL-MCNC: 7 G/DL (ref 6–8.4)
PROTHROMBIN TIME: 10.8 SEC (ref 9–12.5)
RBC # BLD AUTO: 3.66 M/UL (ref 4.6–6.2)
SODIUM SERPL-SCNC: 138 MMOL/L (ref 136–145)
WBC # BLD AUTO: 9.02 K/UL (ref 3.9–12.7)

## 2021-02-11 PROCEDURE — 85610 PROTHROMBIN TIME: CPT

## 2021-02-11 PROCEDURE — 85025 COMPLETE CBC W/AUTO DIFF WBC: CPT

## 2021-02-11 PROCEDURE — 99284 EMERGENCY DEPT VISIT MOD MDM: CPT | Mod: 25

## 2021-02-11 PROCEDURE — 12005 RPR S/N/A/GEN/TRK12.6-20.0CM: CPT

## 2021-02-11 PROCEDURE — 85730 THROMBOPLASTIN TIME PARTIAL: CPT

## 2021-02-11 PROCEDURE — 80053 COMPREHEN METABOLIC PANEL: CPT

## 2021-03-24 ENCOUNTER — ANESTHESIA EVENT (OUTPATIENT)
Dept: SURGERY | Facility: HOSPITAL | Age: 81
End: 2021-03-24
Payer: MEDICARE

## 2021-03-24 ENCOUNTER — HOSPITAL ENCOUNTER (OUTPATIENT)
Facility: HOSPITAL | Age: 81
Discharge: HOME HEALTH | End: 2021-03-25
Attending: EMERGENCY MEDICINE | Admitting: INTERNAL MEDICINE
Payer: MEDICARE

## 2021-03-24 ENCOUNTER — ANESTHESIA (OUTPATIENT)
Dept: SURGERY | Facility: HOSPITAL | Age: 81
End: 2021-03-24
Payer: MEDICARE

## 2021-03-24 DIAGNOSIS — R31.9 HEMATURIA, UNSPECIFIED TYPE: Primary | ICD-10-CM

## 2021-03-24 DIAGNOSIS — N32.0 BLADDER NECK CONTRACTURE: ICD-10-CM

## 2021-03-24 PROBLEM — R03.0 ELEVATED BLOOD PRESSURE READING: Status: RESOLVED | Noted: 2021-03-24 | Resolved: 2021-03-24

## 2021-03-24 PROBLEM — R03.0 ELEVATED BLOOD PRESSURE READING: Status: ACTIVE | Noted: 2021-03-24

## 2021-03-24 PROBLEM — D64.9 ANEMIA: Status: ACTIVE | Noted: 2021-03-24

## 2021-03-24 LAB
ALBUMIN SERPL BCP-MCNC: 3.3 G/DL (ref 3.5–5.2)
ALP SERPL-CCNC: 51 U/L (ref 55–135)
ALT SERPL W/O P-5'-P-CCNC: 21 U/L (ref 10–44)
ANION GAP SERPL CALC-SCNC: 11 MMOL/L (ref 8–16)
APTT BLDCRRT: 26.6 SEC (ref 21–32)
AST SERPL-CCNC: 20 U/L (ref 10–40)
BASOPHILS # BLD AUTO: 0.02 K/UL (ref 0–0.2)
BASOPHILS NFR BLD: 0.2 % (ref 0–1.9)
BILIRUB SERPL-MCNC: 0.8 MG/DL (ref 0.1–1)
BUN SERPL-MCNC: 45 MG/DL (ref 8–23)
CALCIUM SERPL-MCNC: 8.8 MG/DL (ref 8.7–10.5)
CHLORIDE SERPL-SCNC: 102 MMOL/L (ref 95–110)
CO2 SERPL-SCNC: 23 MMOL/L (ref 23–29)
CREAT SERPL-MCNC: 1.8 MG/DL (ref 0.5–1.4)
CTP QC/QA: YES
DIFFERENTIAL METHOD: ABNORMAL
EOSINOPHIL # BLD AUTO: 0.1 K/UL (ref 0–0.5)
EOSINOPHIL NFR BLD: 0.8 % (ref 0–8)
ERYTHROCYTE [DISTWIDTH] IN BLOOD BY AUTOMATED COUNT: 14 % (ref 11.5–14.5)
EST. GFR  (AFRICAN AMERICAN): 40 ML/MIN/1.73 M^2
EST. GFR  (NON AFRICAN AMERICAN): 35 ML/MIN/1.73 M^2
GLUCOSE SERPL-MCNC: 133 MG/DL (ref 70–110)
HCT VFR BLD AUTO: 28.7 % (ref 40–54)
HCT VFR BLD AUTO: 31.5 % (ref 40–54)
HGB BLD-MCNC: 9.3 G/DL (ref 14–18)
HGB BLD-MCNC: 9.8 G/DL (ref 14–18)
IMM GRANULOCYTES # BLD AUTO: 0.06 K/UL (ref 0–0.04)
IMM GRANULOCYTES NFR BLD AUTO: 0.6 % (ref 0–0.5)
INR PPP: 1 (ref 0.8–1.2)
LYMPHOCYTES # BLD AUTO: 0.4 K/UL (ref 1–4.8)
LYMPHOCYTES NFR BLD: 3.8 % (ref 18–48)
MCH RBC QN AUTO: 31.3 PG (ref 27–31)
MCHC RBC AUTO-ENTMCNC: 31.1 G/DL (ref 32–36)
MCV RBC AUTO: 101 FL (ref 82–98)
MONOCYTES # BLD AUTO: 0.8 K/UL (ref 0.3–1)
MONOCYTES NFR BLD: 8.2 % (ref 4–15)
NEUTROPHILS # BLD AUTO: 8.7 K/UL (ref 1.8–7.7)
NEUTROPHILS NFR BLD: 86.4 % (ref 38–73)
NRBC BLD-RTO: 0 /100 WBC
PLATELET # BLD AUTO: 199 K/UL (ref 150–350)
PMV BLD AUTO: 10.3 FL (ref 9.2–12.9)
POTASSIUM SERPL-SCNC: 3.8 MMOL/L (ref 3.5–5.1)
PROT SERPL-MCNC: 6.9 G/DL (ref 6–8.4)
PROTHROMBIN TIME: 11.3 SEC (ref 9–12.5)
RBC # BLD AUTO: 3.13 M/UL (ref 4.6–6.2)
SARS-COV-2 RDRP RESP QL NAA+PROBE: NEGATIVE
SODIUM SERPL-SCNC: 136 MMOL/L (ref 136–145)
WBC # BLD AUTO: 10.11 K/UL (ref 3.9–12.7)

## 2021-03-24 PROCEDURE — 99284 PR EMERGENCY DEPT VISIT,LEVEL IV: ICD-10-PCS | Mod: 25,,, | Performed by: UROLOGY

## 2021-03-24 PROCEDURE — 85025 COMPLETE CBC W/AUTO DIFF WBC: CPT | Performed by: NURSE PRACTITIONER

## 2021-03-24 PROCEDURE — 63600175 PHARM REV CODE 636 W HCPCS: Performed by: NURSE ANESTHETIST, CERTIFIED REGISTERED

## 2021-03-24 PROCEDURE — 25000003 PHARM REV CODE 250: Performed by: NURSE PRACTITIONER

## 2021-03-24 PROCEDURE — 85014 HEMATOCRIT: CPT | Mod: 91 | Performed by: NURSE PRACTITIONER

## 2021-03-24 PROCEDURE — 52001 PR CYSTOURETHROSCOPY W/IRRIG & EVAC CLOTS: ICD-10-PCS | Mod: ,,, | Performed by: UROLOGY

## 2021-03-24 PROCEDURE — 71000033 HC RECOVERY, INTIAL HOUR: Performed by: UROLOGY

## 2021-03-24 PROCEDURE — 37000008 HC ANESTHESIA 1ST 15 MINUTES: Performed by: UROLOGY

## 2021-03-24 PROCEDURE — 25500020 PHARM REV CODE 255: Performed by: UROLOGY

## 2021-03-24 PROCEDURE — 63600175 PHARM REV CODE 636 W HCPCS: Performed by: ANESTHESIOLOGY

## 2021-03-24 PROCEDURE — 80053 COMPREHEN METABOLIC PANEL: CPT | Performed by: NURSE PRACTITIONER

## 2021-03-24 PROCEDURE — 25000003 PHARM REV CODE 250: Performed by: NURSE ANESTHETIST, CERTIFIED REGISTERED

## 2021-03-24 PROCEDURE — C1769 GUIDE WIRE: HCPCS | Performed by: UROLOGY

## 2021-03-24 PROCEDURE — 36415 COLL VENOUS BLD VENIPUNCTURE: CPT | Performed by: NURSE PRACTITIONER

## 2021-03-24 PROCEDURE — G0378 HOSPITAL OBSERVATION PER HR: HCPCS

## 2021-03-24 PROCEDURE — 37000009 HC ANESTHESIA EA ADD 15 MINS: Performed by: UROLOGY

## 2021-03-24 PROCEDURE — 63600175 PHARM REV CODE 636 W HCPCS: Performed by: EMERGENCY MEDICINE

## 2021-03-24 PROCEDURE — 85610 PROTHROMBIN TIME: CPT | Performed by: NURSE PRACTITIONER

## 2021-03-24 PROCEDURE — 36000707: Performed by: UROLOGY

## 2021-03-24 PROCEDURE — 25000003 PHARM REV CODE 250: Performed by: ANESTHESIOLOGY

## 2021-03-24 PROCEDURE — 52276 PR CYSTOSCOPY,DIR VIS INT URETHROTOMY: ICD-10-PCS | Mod: 59,,, | Performed by: UROLOGY

## 2021-03-24 PROCEDURE — 51798 US URINE CAPACITY MEASURE: CPT

## 2021-03-24 PROCEDURE — 85730 THROMBOPLASTIN TIME PARTIAL: CPT | Performed by: NURSE PRACTITIONER

## 2021-03-24 PROCEDURE — U0002 COVID-19 LAB TEST NON-CDC: HCPCS | Performed by: EMERGENCY MEDICINE

## 2021-03-24 PROCEDURE — 52001 CYSTO W/IRRG&EVAC MLT CLOTS: CPT | Mod: ,,, | Performed by: UROLOGY

## 2021-03-24 PROCEDURE — 52276 CYSTOSCOPY AND TREATMENT: CPT | Mod: 59,,, | Performed by: UROLOGY

## 2021-03-24 PROCEDURE — 99285 EMERGENCY DEPT VISIT HI MDM: CPT | Mod: 25

## 2021-03-24 PROCEDURE — 25000003 PHARM REV CODE 250: Performed by: UROLOGY

## 2021-03-24 PROCEDURE — 85018 HEMOGLOBIN: CPT | Performed by: NURSE PRACTITIONER

## 2021-03-24 PROCEDURE — 99284 EMERGENCY DEPT VISIT MOD MDM: CPT | Mod: 25,,, | Performed by: UROLOGY

## 2021-03-24 PROCEDURE — 36000706: Performed by: UROLOGY

## 2021-03-24 PROCEDURE — 27201423 OPTIME MED/SURG SUP & DEVICES STERILE SUPPLY: Performed by: UROLOGY

## 2021-03-24 PROCEDURE — C1758 CATHETER, URETERAL: HCPCS | Performed by: UROLOGY

## 2021-03-24 RX ORDER — ONDANSETRON 2 MG/ML
4 INJECTION INTRAMUSCULAR; INTRAVENOUS DAILY PRN
Status: DISCONTINUED | OUTPATIENT
Start: 2021-03-24 | End: 2021-03-24 | Stop reason: HOSPADM

## 2021-03-24 RX ORDER — PROPOFOL 10 MG/ML
VIAL (ML) INTRAVENOUS
Status: DISCONTINUED | OUTPATIENT
Start: 2021-03-24 | End: 2021-03-24

## 2021-03-24 RX ORDER — ROCURONIUM BROMIDE 10 MG/ML
INJECTION, SOLUTION INTRAVENOUS
Status: DISCONTINUED | OUTPATIENT
Start: 2021-03-24 | End: 2021-03-24

## 2021-03-24 RX ORDER — SUCCINYLCHOLINE CHLORIDE 20 MG/ML
INJECTION INTRAMUSCULAR; INTRAVENOUS
Status: DISCONTINUED | OUTPATIENT
Start: 2021-03-24 | End: 2021-03-24

## 2021-03-24 RX ORDER — FENTANYL CITRATE 50 UG/ML
INJECTION, SOLUTION INTRAMUSCULAR; INTRAVENOUS
Status: DISCONTINUED | OUTPATIENT
Start: 2021-03-24 | End: 2021-03-24

## 2021-03-24 RX ORDER — LIDOCAINE HYDROCHLORIDE 10 MG/ML
INJECTION, SOLUTION EPIDURAL; INFILTRATION; INTRACAUDAL; PERINEURAL
Status: DISCONTINUED | OUTPATIENT
Start: 2021-03-24 | End: 2021-03-24

## 2021-03-24 RX ORDER — FENTANYL CITRATE 50 UG/ML
25 INJECTION, SOLUTION INTRAMUSCULAR; INTRAVENOUS EVERY 5 MIN PRN
Status: DISCONTINUED | OUTPATIENT
Start: 2021-03-24 | End: 2021-03-24 | Stop reason: HOSPADM

## 2021-03-24 RX ORDER — ONDANSETRON 2 MG/ML
INJECTION INTRAMUSCULAR; INTRAVENOUS
Status: DISCONTINUED | OUTPATIENT
Start: 2021-03-24 | End: 2021-03-24

## 2021-03-24 RX ORDER — CEFDINIR 300 MG/1
300 CAPSULE ORAL
Status: DISCONTINUED | OUTPATIENT
Start: 2021-03-24 | End: 2021-03-25 | Stop reason: HOSPADM

## 2021-03-24 RX ORDER — MEPERIDINE HYDROCHLORIDE 25 MG/ML
12.5 INJECTION INTRAMUSCULAR; INTRAVENOUS; SUBCUTANEOUS ONCE AS NEEDED
Status: COMPLETED | OUTPATIENT
Start: 2021-03-24 | End: 2021-03-24

## 2021-03-24 RX ORDER — OXYCODONE AND ACETAMINOPHEN 5; 325 MG/1; MG/1
1 TABLET ORAL
Status: DISCONTINUED | OUTPATIENT
Start: 2021-03-24 | End: 2021-03-24 | Stop reason: HOSPADM

## 2021-03-24 RX ORDER — SODIUM CHLORIDE, SODIUM LACTATE, POTASSIUM CHLORIDE, CALCIUM CHLORIDE 600; 310; 30; 20 MG/100ML; MG/100ML; MG/100ML; MG/100ML
INJECTION, SOLUTION INTRAVENOUS CONTINUOUS PRN
Status: DISCONTINUED | OUTPATIENT
Start: 2021-03-24 | End: 2021-03-24

## 2021-03-24 RX ORDER — EPHEDRINE SULFATE 50 MG/ML
INJECTION, SOLUTION INTRAVENOUS
Status: DISCONTINUED | OUTPATIENT
Start: 2021-03-24 | End: 2021-03-24

## 2021-03-24 RX ORDER — PHENAZOPYRIDINE HYDROCHLORIDE 200 MG/1
200 TABLET, FILM COATED ORAL 3 TIMES DAILY PRN
Qty: 15 TABLET | Refills: 0 | Status: SHIPPED | OUTPATIENT
Start: 2021-03-24

## 2021-03-24 RX ORDER — AMLODIPINE BESYLATE 5 MG/1
5 TABLET ORAL DAILY
COMMUNITY
Start: 2021-02-22

## 2021-03-24 RX ORDER — ATENOLOL 50 MG/1
50 TABLET ORAL DAILY
Status: DISCONTINUED | OUTPATIENT
Start: 2021-03-24 | End: 2021-03-25 | Stop reason: HOSPADM

## 2021-03-24 RX ORDER — TOBRAMYCIN AND DEXAMETHASONE 3; 1 MG/ML; MG/ML
SUSPENSION/ DROPS OPHTHALMIC
COMMUNITY
Start: 2021-03-22

## 2021-03-24 RX ADMIN — MEPERIDINE HYDROCHLORIDE 12.5 MG: 25 INJECTION INTRAMUSCULAR; INTRAVENOUS; SUBCUTANEOUS at 05:03

## 2021-03-24 RX ADMIN — ATENOLOL 50 MG: 50 TABLET ORAL at 06:03

## 2021-03-24 RX ADMIN — LIDOCAINE HYDROCHLORIDE 50 MG: 10 INJECTION, SOLUTION EPIDURAL; INFILTRATION; INTRACAUDAL; PERINEURAL at 04:03

## 2021-03-24 RX ADMIN — SUCCINYLCHOLINE CHLORIDE 100 MG: 20 INJECTION, SOLUTION INTRAMUSCULAR; INTRAVENOUS at 04:03

## 2021-03-24 RX ADMIN — ROCURONIUM BROMIDE 15 MG: 10 INJECTION, SOLUTION INTRAVENOUS at 04:03

## 2021-03-24 RX ADMIN — CEFDINIR 300 MG: 300 CAPSULE ORAL at 06:03

## 2021-03-24 RX ADMIN — FENTANYL CITRATE 50 MCG: 50 INJECTION, SOLUTION INTRAMUSCULAR; INTRAVENOUS at 05:03

## 2021-03-24 RX ADMIN — ONDANSETRON 4 MG: 2 INJECTION, SOLUTION INTRAMUSCULAR; INTRAVENOUS at 04:03

## 2021-03-24 RX ADMIN — PROPOFOL 50 MG: 10 INJECTION, EMULSION INTRAVENOUS at 04:03

## 2021-03-24 RX ADMIN — SUGAMMADEX 200 MG: 100 INJECTION, SOLUTION INTRAVENOUS at 05:03

## 2021-03-24 RX ADMIN — FENTANYL CITRATE 25 MCG: 50 INJECTION, SOLUTION INTRAMUSCULAR; INTRAVENOUS at 04:03

## 2021-03-24 RX ADMIN — EPHEDRINE SULFATE 25 MG: 50 INJECTION INTRAVENOUS at 04:03

## 2021-03-24 RX ADMIN — SODIUM CHLORIDE, SODIUM LACTATE, POTASSIUM CHLORIDE, AND CALCIUM CHLORIDE 1000 ML: .6; .31; .03; .02 INJECTION, SOLUTION INTRAVENOUS at 01:03

## 2021-03-24 RX ADMIN — GLYCOPYRROLATE 0.2 MG: 0.2 INJECTION, SOLUTION INTRAMUSCULAR; INTRAVENOUS at 05:03

## 2021-03-24 RX ADMIN — FENTANYL CITRATE 25 MCG: 50 INJECTION, SOLUTION INTRAMUSCULAR; INTRAVENOUS at 05:03

## 2021-03-24 RX ADMIN — SODIUM CHLORIDE, SODIUM LACTATE, POTASSIUM CHLORIDE, AND CALCIUM CHLORIDE: .6; .31; .03; .02 INJECTION, SOLUTION INTRAVENOUS at 04:03

## 2021-03-25 ENCOUNTER — TELEPHONE (OUTPATIENT)
Dept: UROLOGY | Facility: CLINIC | Age: 81
End: 2021-03-25

## 2021-03-25 VITALS
DIASTOLIC BLOOD PRESSURE: 53 MMHG | OXYGEN SATURATION: 95 % | WEIGHT: 127.88 LBS | TEMPERATURE: 98 F | HEIGHT: 68 IN | RESPIRATION RATE: 16 BRPM | HEART RATE: 57 BPM | SYSTOLIC BLOOD PRESSURE: 100 MMHG | BODY MASS INDEX: 19.38 KG/M2

## 2021-03-25 PROBLEM — R31.9 HEMATURIA: Status: RESOLVED | Noted: 2021-03-24 | Resolved: 2021-03-25

## 2021-03-25 LAB
HCT VFR BLD AUTO: 24.5 % (ref 40–54)
HCT VFR BLD AUTO: 25.4 % (ref 40–54)
HGB BLD-MCNC: 7.9 G/DL (ref 14–18)
HGB BLD-MCNC: 8 G/DL (ref 14–18)

## 2021-03-25 PROCEDURE — 85014 HEMATOCRIT: CPT | Mod: 91 | Performed by: NURSE PRACTITIONER

## 2021-03-25 PROCEDURE — 25000003 PHARM REV CODE 250: Performed by: NURSE PRACTITIONER

## 2021-03-25 PROCEDURE — 36415 COLL VENOUS BLD VENIPUNCTURE: CPT | Performed by: NURSE PRACTITIONER

## 2021-03-25 PROCEDURE — 85018 HEMOGLOBIN: CPT | Mod: 91 | Performed by: NURSE PRACTITIONER

## 2021-03-25 PROCEDURE — G0378 HOSPITAL OBSERVATION PER HR: HCPCS

## 2021-03-25 PROCEDURE — 99024 PR POST-OP FOLLOW-UP VISIT: ICD-10-PCS | Mod: ,,, | Performed by: UROLOGY

## 2021-03-25 PROCEDURE — 99024 POSTOP FOLLOW-UP VISIT: CPT | Mod: ,,, | Performed by: UROLOGY

## 2021-03-25 RX ORDER — CEFDINIR 300 MG/1
300 CAPSULE ORAL DAILY
Qty: 7 CAPSULE | Refills: 0 | Status: SHIPPED | OUTPATIENT
Start: 2021-03-25 | End: 2021-04-01

## 2021-03-25 RX ORDER — MIDODRINE HYDROCHLORIDE 5 MG/1
5 TABLET ORAL ONCE
Status: COMPLETED | OUTPATIENT
Start: 2021-03-25 | End: 2021-03-25

## 2021-03-25 RX ORDER — ACETAMINOPHEN 325 MG/1
650 TABLET ORAL EVERY 6 HOURS PRN
Status: DISCONTINUED | OUTPATIENT
Start: 2021-03-25 | End: 2021-03-25 | Stop reason: HOSPADM

## 2021-03-25 RX ADMIN — ACETAMINOPHEN 650 MG: 325 TABLET ORAL at 01:03

## 2021-03-25 RX ADMIN — MIDODRINE HYDROCHLORIDE 5 MG: 5 TABLET ORAL at 01:03

## 2021-04-28 ENCOUNTER — PATIENT MESSAGE (OUTPATIENT)
Dept: RESEARCH | Facility: HOSPITAL | Age: 81
End: 2021-04-28

## 2021-07-01 ENCOUNTER — PATIENT MESSAGE (OUTPATIENT)
Dept: ADMINISTRATIVE | Facility: OTHER | Age: 81
End: 2021-07-01

## 2022-02-23 DIAGNOSIS — D84.9 IMMUNOSUPPRESSED STATUS: ICD-10-CM

## (undated) DEVICE — SYR ONLY LUER LOCK 20CC

## (undated) DEVICE — ADHESIVE MASTISOL VIAL 48/BX

## (undated) DEVICE — GAUZE SPONGE 4X4 12PLY

## (undated) DEVICE — Device

## (undated) DEVICE — CATH 5FR OPEN END URETERAL

## (undated) DEVICE — SET IRR URLGY 2LINE UNIV SPIKE

## (undated) DEVICE — GOWN SMARTGOWN LVL4 X-LONG XL

## (undated) DEVICE — SEE MEDLINE ITEM 157117

## (undated) DEVICE — SOL IRR NACL .9% 3000ML

## (undated) DEVICE — SOL WATER STRL IRR 1000ML

## (undated) DEVICE — ADAPTER TUOHY BORST 6FR

## (undated) DEVICE — UROVIEW 2600/2800

## (undated) DEVICE — SET IRRIGATION WARMING NORMAL

## (undated) DEVICE — CLOSURE SKIN STERI STRIP 1/2X4

## (undated) DEVICE — CONTAINER SPECIMEN STRL 4OZ

## (undated) DEVICE — WIRE GUIDE 0.038OLD

## (undated) DEVICE — SEE MEDLINE ITEM 152487

## (undated) DEVICE — SKIN MARKER DEVON 160

## (undated) DEVICE — SEE MEDLINE ITEM 154981

## (undated) DEVICE — GLOVE SURG BIOGEL LATEX SZ 7.5

## (undated) DEVICE — SEE MEDLINE ITEM 157185

## (undated) DEVICE — BAG DRAIN URINARY CONT IRRG

## (undated) DEVICE — SOL IRR GLY D RX 1.5%

## (undated) DEVICE — GUIDE WIRE MOTION .035 X 150CM

## (undated) DEVICE — SEE MEDLINE ITEM 152622